# Patient Record
Sex: FEMALE | Employment: OTHER | URBAN - METROPOLITAN AREA
[De-identification: names, ages, dates, MRNs, and addresses within clinical notes are randomized per-mention and may not be internally consistent; named-entity substitution may affect disease eponyms.]

---

## 2022-01-01 ENCOUNTER — APPOINTMENT (OUTPATIENT)
Dept: CT IMAGING | Age: 82
DRG: 082 | End: 2022-01-01
Payer: MEDICARE

## 2022-01-01 ENCOUNTER — HOSPITAL ENCOUNTER (INPATIENT)
Age: 82
LOS: 6 days | DRG: 082 | End: 2022-12-05
Attending: EMERGENCY MEDICINE | Admitting: SURGERY
Payer: MEDICARE

## 2022-01-01 ENCOUNTER — APPOINTMENT (OUTPATIENT)
Dept: INTERVENTIONAL RADIOLOGY/VASCULAR | Age: 82
DRG: 082 | End: 2022-01-01
Payer: MEDICARE

## 2022-01-01 ENCOUNTER — APPOINTMENT (OUTPATIENT)
Dept: GENERAL RADIOLOGY | Age: 82
DRG: 082 | End: 2022-01-01
Payer: MEDICARE

## 2022-01-01 VITALS
BODY MASS INDEX: 31.28 KG/M2 | RESPIRATION RATE: 16 BRPM | SYSTOLIC BLOOD PRESSURE: 222 MMHG | WEIGHT: 145 LBS | TEMPERATURE: 97.9 F | OXYGEN SATURATION: 100 % | HEART RATE: 108 BPM | HEIGHT: 57 IN | DIASTOLIC BLOOD PRESSURE: 114 MMHG

## 2022-01-01 DIAGNOSIS — S06.5XAA SUBDURAL HEMATOMA: Primary | ICD-10-CM

## 2022-01-01 LAB
ABO/RH: NORMAL
ABSOLUTE EOS #: 0.14 K/UL (ref 0–0.44)
ABSOLUTE EOS #: 0.16 K/UL (ref 0–0.44)
ABSOLUTE EOS #: 0.18 K/UL (ref 0–0.44)
ABSOLUTE EOS #: 0.19 K/UL (ref 0–0.44)
ABSOLUTE IMMATURE GRANULOCYTE: 0.04 K/UL (ref 0–0.3)
ABSOLUTE IMMATURE GRANULOCYTE: 0.06 K/UL (ref 0–0.3)
ABSOLUTE IMMATURE GRANULOCYTE: 0.07 K/UL (ref 0–0.3)
ABSOLUTE IMMATURE GRANULOCYTE: 0.07 K/UL (ref 0–0.3)
ABSOLUTE LYMPH #: 0.96 K/UL (ref 1.1–3.7)
ABSOLUTE LYMPH #: 1.29 K/UL (ref 1.1–3.7)
ABSOLUTE LYMPH #: 1.41 K/UL (ref 1.1–3.7)
ABSOLUTE LYMPH #: 1.49 K/UL (ref 1.1–3.7)
ABSOLUTE MONO #: 0.74 K/UL (ref 0.1–1.2)
ABSOLUTE MONO #: 0.87 K/UL (ref 0.1–1.2)
ABSOLUTE MONO #: 0.92 K/UL (ref 0.1–1.2)
ABSOLUTE MONO #: 1.03 K/UL (ref 0.1–1.2)
ALBUMIN SERPL-MCNC: 4.3 G/DL (ref 3.5–5.2)
ANION GAP SERPL CALCULATED.3IONS-SCNC: 10 MMOL/L (ref 9–17)
ANION GAP SERPL CALCULATED.3IONS-SCNC: 11 MMOL/L (ref 9–17)
ANION GAP SERPL CALCULATED.3IONS-SCNC: 12 MMOL/L (ref 9–17)
ANION GAP SERPL CALCULATED.3IONS-SCNC: 14 MMOL/L (ref 9–17)
ANION GAP SERPL CALCULATED.3IONS-SCNC: 15 MMOL/L (ref 9–17)
ANTIBODY SCREEN: NEGATIVE
ARM BAND NUMBER: NORMAL
BASOPHILS # BLD: 0 % (ref 0–2)
BASOPHILS # BLD: 0 % (ref 0–2)
BASOPHILS # BLD: 1 % (ref 0–2)
BASOPHILS # BLD: 1 % (ref 0–2)
BASOPHILS ABSOLUTE: 0.03 K/UL (ref 0–0.2)
BASOPHILS ABSOLUTE: 0.04 K/UL (ref 0–0.2)
BASOPHILS ABSOLUTE: 0.06 K/UL (ref 0–0.2)
BASOPHILS ABSOLUTE: 0.08 K/UL (ref 0–0.2)
BILIRUBIN URINE: NEGATIVE
BLOOD BANK SPECIMEN: ABNORMAL
BUN BLDV-MCNC: 10 MG/DL (ref 8–23)
BUN BLDV-MCNC: 10 MG/DL (ref 8–23)
BUN BLDV-MCNC: 13 MG/DL (ref 8–23)
BUN BLDV-MCNC: 7 MG/DL (ref 8–23)
BUN BLDV-MCNC: 7 MG/DL (ref 8–23)
BUN BLDV-MCNC: 9 MG/DL (ref 8–23)
CALCIUM IONIZED: 1.11 MMOL/L (ref 1.13–1.33)
CALCIUM SERPL-MCNC: 7.8 MG/DL (ref 8.6–10.4)
CALCIUM SERPL-MCNC: 8.5 MG/DL (ref 8.6–10.4)
CALCIUM SERPL-MCNC: 8.8 MG/DL (ref 8.6–10.4)
CALCIUM SERPL-MCNC: 8.8 MG/DL (ref 8.6–10.4)
CALCIUM SERPL-MCNC: 9.1 MG/DL (ref 8.6–10.4)
CARBOXYHEMOGLOBIN: 1.2 % (ref 0–5)
CHLORIDE BLD-SCNC: 100 MMOL/L (ref 98–107)
CHLORIDE BLD-SCNC: 101 MMOL/L (ref 98–107)
CHLORIDE BLD-SCNC: 90 MMOL/L (ref 98–107)
CHLORIDE BLD-SCNC: 94 MMOL/L (ref 98–107)
CHLORIDE BLD-SCNC: 94 MMOL/L (ref 98–107)
CHLORIDE BLD-SCNC: 96 MMOL/L (ref 98–107)
CHLORIDE BLD-SCNC: 99 MMOL/L (ref 98–107)
CHLORIDE BLD-SCNC: 99 MMOL/L (ref 98–107)
CO2: 18 MMOL/L (ref 20–31)
CO2: 20 MMOL/L (ref 20–31)
CO2: 21 MMOL/L (ref 20–31)
CO2: 21 MMOL/L (ref 20–31)
CO2: 22 MMOL/L (ref 20–31)
CO2: 23 MMOL/L (ref 20–31)
COLLAGEN ADENOSINE-5'-DIPHOSPHATE (ADP) TIME: 82 SEC (ref 67–112)
COLLAGEN EPINEPHRINE TIME: 116 SEC (ref 85–172)
COLOR: YELLOW
COMMENT UA: ABNORMAL
CREAT SERPL-MCNC: 0.44 MG/DL (ref 0.5–0.9)
CREAT SERPL-MCNC: 0.45 MG/DL (ref 0.5–0.9)
CREAT SERPL-MCNC: 0.47 MG/DL (ref 0.5–0.9)
CREAT SERPL-MCNC: 0.49 MG/DL (ref 0.5–0.9)
CREAT SERPL-MCNC: 0.54 MG/DL (ref 0.5–0.9)
CREAT SERPL-MCNC: 0.66 MG/DL (ref 0.5–0.9)
CREAT SERPL-MCNC: 0.7 MG/DL (ref 0.5–0.9)
CREAT SERPL-MCNC: 0.74 MG/DL (ref 0.5–0.9)
EOSINOPHILS RELATIVE PERCENT: 1 % (ref 1–4)
EOSINOPHILS RELATIVE PERCENT: 1 % (ref 1–4)
EOSINOPHILS RELATIVE PERCENT: 2 % (ref 1–4)
EOSINOPHILS RELATIVE PERCENT: 2 % (ref 1–4)
ESTIMATED AVERAGE GLUCOSE: 103 MG/DL
ETHANOL PERCENT: <0.01 %
ETHANOL: <10 MG/DL
EXPIRATION DATE: NORMAL
FIO2: ABNORMAL
GFR SERPL CREATININE-BSD FRML MDRD: 56 ML/MIN/1.73M2
GFR SERPL CREATININE-BSD FRML MDRD: >60 ML/MIN/1.73M2
GLUCOSE BLD-MCNC: 100 MG/DL (ref 70–99)
GLUCOSE BLD-MCNC: 106 MG/DL (ref 70–99)
GLUCOSE BLD-MCNC: 107 MG/DL (ref 70–99)
GLUCOSE BLD-MCNC: 116 MG/DL (ref 70–99)
GLUCOSE BLD-MCNC: 126 MG/DL (ref 70–99)
GLUCOSE BLD-MCNC: 135 MG/DL (ref 70–99)
GLUCOSE BLD-MCNC: 136 MG/DL (ref 65–105)
GLUCOSE BLD-MCNC: 150 MG/DL (ref 70–99)
GLUCOSE BLD-MCNC: 92 MG/DL (ref 70–99)
GLUCOSE URINE: NEGATIVE
HBA1C MFR BLD: 5.2 % (ref 4–6)
HCG QUALITATIVE: NEGATIVE
HCO3 VENOUS: 25.8 MMOL/L (ref 24–30)
HCT VFR BLD CALC: 28.6 % (ref 36.3–47.1)
HCT VFR BLD CALC: 29.9 % (ref 36.3–47.1)
HCT VFR BLD CALC: 31.6 % (ref 36.3–47.1)
HCT VFR BLD CALC: 33.4 % (ref 36.3–47.1)
HCT VFR BLD CALC: 38.6 % (ref 36.3–47.1)
HEMOGLOBIN: 10.2 G/DL (ref 11.9–15.1)
HEMOGLOBIN: 10.4 G/DL (ref 11.9–15.1)
HEMOGLOBIN: 11.2 G/DL (ref 11.9–15.1)
HEMOGLOBIN: 13.2 G/DL (ref 11.9–15.1)
HEMOGLOBIN: 9.9 G/DL (ref 11.9–15.1)
IMMATURE GRANULOCYTES: 0 %
IMMATURE GRANULOCYTES: 1 %
INR BLD: 1
KETONES, URINE: NEGATIVE
LEUKOCYTE ESTERASE, URINE: NEGATIVE
LYMPHOCYTES # BLD: 11 % (ref 24–43)
LYMPHOCYTES # BLD: 13 % (ref 24–43)
LYMPHOCYTES # BLD: 16 % (ref 24–43)
LYMPHOCYTES # BLD: 9 % (ref 24–43)
MAGNESIUM: 1.5 MG/DL (ref 1.6–2.6)
MAGNESIUM: 1.7 MG/DL (ref 1.6–2.6)
MAGNESIUM: 2 MG/DL (ref 1.6–2.6)
MCH RBC QN AUTO: 31.3 PG (ref 25.2–33.5)
MCH RBC QN AUTO: 31.6 PG (ref 25.2–33.5)
MCH RBC QN AUTO: 31.7 PG (ref 25.2–33.5)
MCH RBC QN AUTO: 31.7 PG (ref 25.2–33.5)
MCH RBC QN AUTO: 32 PG (ref 25.2–33.5)
MCHC RBC AUTO-ENTMCNC: 32.9 G/DL (ref 28.4–34.8)
MCHC RBC AUTO-ENTMCNC: 33.5 G/DL (ref 28.4–34.8)
MCHC RBC AUTO-ENTMCNC: 34.1 G/DL (ref 28.4–34.8)
MCHC RBC AUTO-ENTMCNC: 34.2 G/DL (ref 28.4–34.8)
MCHC RBC AUTO-ENTMCNC: 34.6 G/DL (ref 28.4–34.8)
MCV RBC AUTO: 91.5 FL (ref 82.6–102.9)
MCV RBC AUTO: 91.7 FL (ref 82.6–102.9)
MCV RBC AUTO: 92.9 FL (ref 82.6–102.9)
MCV RBC AUTO: 94.4 FL (ref 82.6–102.9)
MCV RBC AUTO: 97.2 FL (ref 82.6–102.9)
MONOCYTES # BLD: 11 % (ref 3–12)
MONOCYTES # BLD: 7 % (ref 3–12)
MONOCYTES # BLD: 8 % (ref 3–12)
MONOCYTES # BLD: 8 % (ref 3–12)
MRSA, DNA, NASAL: NEGATIVE
MYOGLOBIN: 56 NG/ML (ref 25–58)
NEGATIVE BASE EXCESS, VEN: 0.2 MMOL/L (ref 0–2)
NITRITE, URINE: NEGATIVE
NRBC AUTOMATED: 0 PER 100 WBC
O2 SAT, VEN: 57.4 % (ref 60–85)
PARTIAL THROMBOPLASTIN TIME: 25 SEC (ref 20.5–30.5)
PATIENT TEMP: 37
PCO2, VEN: 49.9 MM HG (ref 39–55)
PDW BLD-RTO: 12.7 % (ref 11.8–14.4)
PDW BLD-RTO: 12.9 % (ref 11.8–14.4)
PDW BLD-RTO: 13 % (ref 11.8–14.4)
PH UA: 7.5 (ref 5–8)
PH VENOUS: 7.33 (ref 7.32–7.42)
PHOSPHORUS: 3.1 MG/DL (ref 2.6–4.5)
PLATELET # BLD: 188 K/UL (ref 138–453)
PLATELET # BLD: 191 K/UL (ref 138–453)
PLATELET # BLD: 213 K/UL (ref 138–453)
PLATELET # BLD: 240 K/UL (ref 138–453)
PLATELET # BLD: 247 K/UL (ref 138–453)
PLATELET FUNCTION INTERP: NORMAL
PMV BLD AUTO: 10.3 FL (ref 8.1–13.5)
PMV BLD AUTO: 10.4 FL (ref 8.1–13.5)
PMV BLD AUTO: 10.9 FL (ref 8.1–13.5)
PMV BLD AUTO: 11.4 FL (ref 8.1–13.5)
PMV BLD AUTO: 11.5 FL (ref 8.1–13.5)
PO2, VEN: 33 MM HG (ref 30–50)
POTASSIUM SERPL-SCNC: 3.1 MMOL/L (ref 3.7–5.3)
POTASSIUM SERPL-SCNC: 3.3 MMOL/L (ref 3.7–5.3)
POTASSIUM SERPL-SCNC: 3.4 MMOL/L (ref 3.7–5.3)
POTASSIUM SERPL-SCNC: 3.7 MMOL/L (ref 3.7–5.3)
POTASSIUM SERPL-SCNC: 3.9 MMOL/L (ref 3.7–5.3)
POTASSIUM SERPL-SCNC: 4 MMOL/L (ref 3.7–5.3)
PROTEIN UA: NEGATIVE
PROTHROMBIN TIME: 10.3 SEC (ref 9.1–12.3)
RBC # BLD: 3.12 M/UL (ref 3.95–5.11)
RBC # BLD: 3.22 M/UL (ref 3.95–5.11)
RBC # BLD: 3.25 M/UL (ref 3.95–5.11)
RBC # BLD: 3.54 M/UL (ref 3.95–5.11)
RBC # BLD: 4.22 M/UL (ref 3.95–5.11)
SEG NEUTROPHILS: 71 % (ref 36–65)
SEG NEUTROPHILS: 75 % (ref 36–65)
SEG NEUTROPHILS: 78 % (ref 36–65)
SEG NEUTROPHILS: 82 % (ref 36–65)
SEGMENTED NEUTROPHILS ABSOLUTE COUNT: 6.3 K/UL (ref 1.5–8.1)
SEGMENTED NEUTROPHILS ABSOLUTE COUNT: 8.31 K/UL (ref 1.5–8.1)
SEGMENTED NEUTROPHILS ABSOLUTE COUNT: 8.33 K/UL (ref 1.5–8.1)
SEGMENTED NEUTROPHILS ABSOLUTE COUNT: 8.89 K/UL (ref 1.5–8.1)
SODIUM BLD-SCNC: 125 MMOL/L (ref 135–144)
SODIUM BLD-SCNC: 129 MMOL/L (ref 135–144)
SODIUM BLD-SCNC: 129 MMOL/L (ref 135–144)
SODIUM BLD-SCNC: 130 MMOL/L (ref 135–144)
SODIUM BLD-SCNC: 131 MMOL/L (ref 135–144)
SODIUM BLD-SCNC: 132 MMOL/L (ref 135–144)
SODIUM BLD-SCNC: 133 MMOL/L (ref 135–144)
SODIUM BLD-SCNC: 133 MMOL/L (ref 135–144)
SPECIFIC GRAVITY UA: 1.05 (ref 1–1.03)
SPECIMEN DESCRIPTION: NORMAL
THYROXINE, FREE: 1.41 NG/DL (ref 0.93–1.7)
TROPONIN, HIGH SENSITIVITY: 9 NG/L (ref 0–14)
TSH SERPL DL<=0.05 MIU/L-ACNC: 1.19 UIU/ML (ref 0.3–5)
TURBIDITY: CLEAR
URINE HGB: NEGATIVE
UROBILINOGEN, URINE: NORMAL
VITAMIN B-12: 1202 PG/ML (ref 232–1245)
VITAMIN D 25-HYDROXY: 16.5 NG/ML
WBC # BLD: 10.3 K/UL (ref 3.5–11.3)
WBC # BLD: 10.9 K/UL (ref 3.5–11.3)
WBC # BLD: 11.4 K/UL (ref 3.5–11.3)
WBC # BLD: 16.4 K/UL (ref 3.5–11.3)
WBC # BLD: 9.1 K/UL (ref 3.5–11.3)

## 2022-01-01 PROCEDURE — 85025 COMPLETE CBC W/AUTO DIFF WBC: CPT

## 2022-01-01 PROCEDURE — 6370000000 HC RX 637 (ALT 250 FOR IP): Performed by: STUDENT IN AN ORGANIZED HEALTH CARE EDUCATION/TRAINING PROGRAM

## 2022-01-01 PROCEDURE — 97110 THERAPEUTIC EXERCISES: CPT

## 2022-01-01 PROCEDURE — 2580000003 HC RX 258

## 2022-01-01 PROCEDURE — 6370000000 HC RX 637 (ALT 250 FOR IP): Performed by: NURSE PRACTITIONER

## 2022-01-01 PROCEDURE — 2580000003 HC RX 258: Performed by: NURSE PRACTITIONER

## 2022-01-01 PROCEDURE — C1769 GUIDE WIRE: HCPCS

## 2022-01-01 PROCEDURE — 2500000003 HC RX 250 WO HCPCS

## 2022-01-01 PROCEDURE — 36415 COLL VENOUS BLD VENIPUNCTURE: CPT

## 2022-01-01 PROCEDURE — 6360000002 HC RX W HCPCS: Performed by: STUDENT IN AN ORGANIZED HEALTH CARE EDUCATION/TRAINING PROGRAM

## 2022-01-01 PROCEDURE — 84100 ASSAY OF PHOSPHORUS: CPT

## 2022-01-01 PROCEDURE — APPNB30 APP NON BILLABLE TIME 0-30 MINS: Performed by: REGISTERED NURSE

## 2022-01-01 PROCEDURE — 97530 THERAPEUTIC ACTIVITIES: CPT

## 2022-01-01 PROCEDURE — 71260 CT THORAX DX C+: CPT

## 2022-01-01 PROCEDURE — 80048 BASIC METABOLIC PNL TOTAL CA: CPT

## 2022-01-01 PROCEDURE — 85730 THROMBOPLASTIN TIME PARTIAL: CPT

## 2022-01-01 PROCEDURE — 99232 SBSQ HOSP IP/OBS MODERATE 35: CPT | Performed by: NEUROLOGICAL SURGERY

## 2022-01-01 PROCEDURE — 83735 ASSAY OF MAGNESIUM: CPT

## 2022-01-01 PROCEDURE — 99222 1ST HOSP IP/OBS MODERATE 55: CPT | Performed by: FAMILY MEDICINE

## 2022-01-01 PROCEDURE — 86901 BLOOD TYPING SEROLOGIC RH(D): CPT

## 2022-01-01 PROCEDURE — 6360000002 HC RX W HCPCS

## 2022-01-01 PROCEDURE — APPSS15 APP SPLIT SHARED TIME 0-15 MINUTES: Performed by: NURSE PRACTITIONER

## 2022-01-01 PROCEDURE — 6360000004 HC RX CONTRAST MEDICATION

## 2022-01-01 PROCEDURE — 99231 SBSQ HOSP IP/OBS SF/LOW 25: CPT | Performed by: NEUROLOGICAL SURGERY

## 2022-01-01 PROCEDURE — 82306 VITAMIN D 25 HYDROXY: CPT

## 2022-01-01 PROCEDURE — 2580000003 HC RX 258: Performed by: STUDENT IN AN ORGANIZED HEALTH CARE EDUCATION/TRAINING PROGRAM

## 2022-01-01 PROCEDURE — B0281ZZ COMPUTERIZED TOMOGRAPHY (CT SCAN) OF CEREBRAL VENTRICLE(S) USING LOW OSMOLAR CONTRAST: ICD-10-PCS | Performed by: PSYCHIATRY & NEUROLOGY

## 2022-01-01 PROCEDURE — 99152 MOD SED SAME PHYS/QHP 5/>YRS: CPT

## 2022-01-01 PROCEDURE — 2580000003 HC RX 258: Performed by: PSYCHIATRY & NEUROLOGY

## 2022-01-01 PROCEDURE — 85610 PROTHROMBIN TIME: CPT

## 2022-01-01 PROCEDURE — 99233 SBSQ HOSP IP/OBS HIGH 50: CPT | Performed by: PSYCHIATRY & NEUROLOGY

## 2022-01-01 PROCEDURE — 70450 CT HEAD/BRAIN W/O DYE: CPT

## 2022-01-01 PROCEDURE — 2000000000 HC ICU R&B

## 2022-01-01 PROCEDURE — 36226 PLACE CATH VERTEBRAL ART: CPT

## 2022-01-01 PROCEDURE — 36224 PLACE CATH CAROTD ART: CPT

## 2022-01-01 PROCEDURE — 72125 CT NECK SPINE W/O DYE: CPT

## 2022-01-01 PROCEDURE — 81003 URINALYSIS AUTO W/O SCOPE: CPT

## 2022-01-01 PROCEDURE — 82947 ASSAY GLUCOSE BLOOD QUANT: CPT

## 2022-01-01 PROCEDURE — 2060000000 HC ICU INTERMEDIATE R&B

## 2022-01-01 PROCEDURE — 82607 VITAMIN B-12: CPT

## 2022-01-01 PROCEDURE — 73552 X-RAY EXAM OF FEMUR 2/>: CPT

## 2022-01-01 PROCEDURE — 92523 SPEECH SOUND LANG COMPREHEN: CPT

## 2022-01-01 PROCEDURE — 97535 SELF CARE MNGMENT TRAINING: CPT

## 2022-01-01 PROCEDURE — C1887 CATHETER, GUIDING: HCPCS

## 2022-01-01 PROCEDURE — 3209999900 CT LUMBAR SPINE TRAUMA RECONSTRUCTION

## 2022-01-01 PROCEDURE — 36227 PLACE CATH XTRNL CAROTID: CPT

## 2022-01-01 PROCEDURE — 99153 MOD SED SAME PHYS/QHP EA: CPT

## 2022-01-01 PROCEDURE — 99285 EMERGENCY DEPT VISIT HI MDM: CPT

## 2022-01-01 PROCEDURE — 6370000000 HC RX 637 (ALT 250 FOR IP)

## 2022-01-01 PROCEDURE — 80051 ELECTROLYTE PANEL: CPT

## 2022-01-01 PROCEDURE — 84443 ASSAY THYROID STIM HORMONE: CPT

## 2022-01-01 PROCEDURE — 84439 ASSAY OF FREE THYROXINE: CPT

## 2022-01-01 PROCEDURE — 4A03X5D MEASUREMENT OF ARTERIAL FLOW, INTRACRANIAL, EXTERNAL APPROACH: ICD-10-PCS

## 2022-01-01 PROCEDURE — C1760 CLOSURE DEV, VASC: HCPCS

## 2022-01-01 PROCEDURE — 97166 OT EVAL MOD COMPLEX 45 MIN: CPT

## 2022-01-01 PROCEDURE — 2700000000 HC OXYGEN THERAPY PER DAY

## 2022-01-01 PROCEDURE — 82565 ASSAY OF CREATININE: CPT

## 2022-01-01 PROCEDURE — 70498 CT ANGIOGRAPHY NECK: CPT

## 2022-01-01 PROCEDURE — 6360000004 HC RX CONTRAST MEDICATION: Performed by: NURSE PRACTITIONER

## 2022-01-01 PROCEDURE — 94761 N-INVAS EAR/PLS OXIMETRY MLT: CPT

## 2022-01-01 PROCEDURE — 87641 MR-STAPH DNA AMP PROBE: CPT

## 2022-01-01 PROCEDURE — 70496 CT ANGIOGRAPHY HEAD: CPT

## 2022-01-01 PROCEDURE — 3209999900 CT THORACIC SPINE TRAUMA RECONSTRUCTION

## 2022-01-01 PROCEDURE — 82330 ASSAY OF CALCIUM: CPT

## 2022-01-01 PROCEDURE — 86900 BLOOD TYPING SEROLOGIC ABO: CPT

## 2022-01-01 PROCEDURE — 83036 HEMOGLOBIN GLYCOSYLATED A1C: CPT

## 2022-01-01 PROCEDURE — 92507 TX SP LANG VOICE COMM INDIV: CPT

## 2022-01-01 PROCEDURE — 6360000004 HC RX CONTRAST MEDICATION: Performed by: SURGERY

## 2022-01-01 PROCEDURE — 6360000002 HC RX W HCPCS: Performed by: PSYCHIATRY & NEUROLOGY

## 2022-01-01 PROCEDURE — 84703 CHORIONIC GONADOTROPIN ASSAY: CPT

## 2022-01-01 PROCEDURE — 97162 PT EVAL MOD COMPLEX 30 MIN: CPT

## 2022-01-01 PROCEDURE — 82040 ASSAY OF SERUM ALBUMIN: CPT

## 2022-01-01 PROCEDURE — 2709999900 HC NON-CHARGEABLE SUPPLY

## 2022-01-01 PROCEDURE — 86850 RBC ANTIBODY SCREEN: CPT

## 2022-01-01 PROCEDURE — 99223 1ST HOSP IP/OBS HIGH 75: CPT | Performed by: PSYCHIATRY & NEUROLOGY

## 2022-01-01 PROCEDURE — 82805 BLOOD GASES W/O2 SATURATION: CPT

## 2022-01-01 PROCEDURE — 85027 COMPLETE CBC AUTOMATED: CPT

## 2022-01-01 PROCEDURE — 85576 BLOOD PLATELET AGGREGATION: CPT

## 2022-01-01 PROCEDURE — 84520 ASSAY OF UREA NITROGEN: CPT

## 2022-01-01 PROCEDURE — C1894 INTRO/SHEATH, NON-LASER: HCPCS

## 2022-01-01 PROCEDURE — 83874 ASSAY OF MYOGLOBIN: CPT

## 2022-01-01 PROCEDURE — 84484 ASSAY OF TROPONIN QUANT: CPT

## 2022-01-01 PROCEDURE — G0480 DRUG TEST DEF 1-7 CLASSES: HCPCS

## 2022-01-01 PROCEDURE — 51702 INSERT TEMP BLADDER CATH: CPT

## 2022-01-01 RX ORDER — ONDANSETRON 4 MG/1
4 TABLET, ORALLY DISINTEGRATING ORAL EVERY 8 HOURS PRN
Status: DISCONTINUED | OUTPATIENT
Start: 2022-01-01 | End: 2022-01-01 | Stop reason: HOSPADM

## 2022-01-01 RX ORDER — POTASSIUM CHLORIDE 7.45 MG/ML
10 INJECTION INTRAVENOUS
Status: DISCONTINUED | OUTPATIENT
Start: 2022-01-01 | End: 2022-01-01

## 2022-01-01 RX ORDER — SODIUM CHLORIDE 9 MG/ML
INJECTION, SOLUTION INTRAVENOUS PRN
Status: DISCONTINUED | OUTPATIENT
Start: 2022-01-01 | End: 2022-01-01

## 2022-01-01 RX ORDER — HYDROCHLOROTHIAZIDE 25 MG/1
25 TABLET ORAL DAILY
COMMUNITY

## 2022-01-01 RX ORDER — LEVOFLOXACIN 5 MG/ML
INJECTION, SOLUTION INTRAVENOUS CONTINUOUS PRN
Status: COMPLETED | OUTPATIENT
Start: 2022-01-01 | End: 2022-01-01

## 2022-01-01 RX ORDER — POLYETHYLENE GLYCOL 3350 17 G/17G
17 POWDER, FOR SOLUTION ORAL DAILY
Status: DISCONTINUED | OUTPATIENT
Start: 2022-01-01 | End: 2022-01-01

## 2022-01-01 RX ORDER — GLIMEPIRIDE 2 MG/1
1 TABLET ORAL 2 TIMES DAILY
Status: DISCONTINUED | OUTPATIENT
Start: 2022-01-01 | End: 2022-01-01

## 2022-01-01 RX ORDER — LEVETIRACETAM 10 MG/ML
1000 INJECTION INTRAVASCULAR ONCE
Status: COMPLETED | OUTPATIENT
Start: 2022-01-01 | End: 2022-01-01

## 2022-01-01 RX ORDER — ONDANSETRON 2 MG/ML
4 INJECTION INTRAMUSCULAR; INTRAVENOUS ONCE
Status: COMPLETED | OUTPATIENT
Start: 2022-01-01 | End: 2022-01-01

## 2022-01-01 RX ORDER — SODIUM CHLORIDE 0.9 % (FLUSH) 0.9 %
5-40 SYRINGE (ML) INJECTION EVERY 12 HOURS SCHEDULED
Status: DISCONTINUED | OUTPATIENT
Start: 2022-01-01 | End: 2022-01-01

## 2022-01-01 RX ORDER — LATANOPROST 50 UG/ML
1 SOLUTION/ DROPS OPHTHALMIC NIGHTLY
Status: DISCONTINUED | OUTPATIENT
Start: 2022-01-01 | End: 2022-01-01

## 2022-01-01 RX ORDER — LISINOPRIL 10 MG/1
10 TABLET ORAL DAILY
COMMUNITY

## 2022-01-01 RX ORDER — MORPHINE SULFATE 2 MG/ML
2 INJECTION, SOLUTION INTRAMUSCULAR; INTRAVENOUS
Status: DISCONTINUED | OUTPATIENT
Start: 2022-01-01 | End: 2022-01-01 | Stop reason: HOSPADM

## 2022-01-01 RX ORDER — LEVETIRACETAM 5 MG/ML
500 INJECTION INTRAVASCULAR EVERY 12 HOURS
Status: DISCONTINUED | OUTPATIENT
Start: 2022-01-01 | End: 2022-01-01

## 2022-01-01 RX ORDER — ONDANSETRON 2 MG/ML
4 INJECTION INTRAMUSCULAR; INTRAVENOUS EVERY 6 HOURS PRN
Status: DISCONTINUED | OUTPATIENT
Start: 2022-01-01 | End: 2022-01-01 | Stop reason: HOSPADM

## 2022-01-01 RX ORDER — LEVETIRACETAM 500 MG/1
500 TABLET ORAL 2 TIMES DAILY
Status: DISCONTINUED | OUTPATIENT
Start: 2022-01-01 | End: 2022-01-01

## 2022-01-01 RX ORDER — LEVOFLOXACIN 5 MG/ML
500 INJECTION, SOLUTION INTRAVENOUS ONCE
Status: DISCONTINUED | OUTPATIENT
Start: 2022-01-01 | End: 2022-01-01

## 2022-01-01 RX ORDER — ACETAMINOPHEN 500 MG
500 TABLET ORAL EVERY 6 HOURS PRN
COMMUNITY

## 2022-01-01 RX ORDER — ONDANSETRON 2 MG/ML
INJECTION INTRAMUSCULAR; INTRAVENOUS
Status: COMPLETED
Start: 2022-01-01 | End: 2022-01-01

## 2022-01-01 RX ORDER — SODIUM CHLORIDE 9 MG/ML
INJECTION, SOLUTION INTRAVENOUS CONTINUOUS
Status: DISCONTINUED | OUTPATIENT
Start: 2022-01-01 | End: 2022-01-01

## 2022-01-01 RX ORDER — ACETAMINOPHEN 500 MG
1000 TABLET ORAL EVERY 8 HOURS SCHEDULED
Status: DISCONTINUED | OUTPATIENT
Start: 2022-01-01 | End: 2022-01-01

## 2022-01-01 RX ORDER — ACETAMINOPHEN 325 MG/1
650 TABLET ORAL EVERY 4 HOURS PRN
Status: DISCONTINUED | OUTPATIENT
Start: 2022-01-01 | End: 2022-01-01

## 2022-01-01 RX ORDER — LISINOPRIL 10 MG/1
10 TABLET ORAL DAILY
Status: DISCONTINUED | OUTPATIENT
Start: 2022-01-01 | End: 2022-01-01

## 2022-01-01 RX ORDER — LEVETIRACETAM 10 MG/ML
INJECTION INTRAVASCULAR
Status: COMPLETED
Start: 2022-01-01 | End: 2022-01-01

## 2022-01-01 RX ORDER — ASPIRIN 81 MG/1
81 TABLET, CHEWABLE ORAL DAILY
COMMUNITY

## 2022-01-01 RX ORDER — SENNA PLUS 8.6 MG/1
1 TABLET ORAL NIGHTLY
Status: DISCONTINUED | OUTPATIENT
Start: 2022-01-01 | End: 2022-01-01

## 2022-01-01 RX ORDER — ENOXAPARIN SODIUM 100 MG/ML
30 INJECTION SUBCUTANEOUS 2 TIMES DAILY
Status: DISCONTINUED | OUTPATIENT
Start: 2022-01-01 | End: 2022-01-01

## 2022-01-01 RX ORDER — DIPHENHYDRAMINE HYDROCHLORIDE, ZINC ACETATE 2; .1 G/100G; G/100G
CREAM TOPICAL 3 TIMES DAILY PRN
Status: DISCONTINUED | OUTPATIENT
Start: 2022-01-01 | End: 2022-01-01

## 2022-01-01 RX ORDER — ALPRAZOLAM 0.25 MG/1
0.25 TABLET ORAL NIGHTLY PRN
COMMUNITY

## 2022-01-01 RX ORDER — HYDROCHLOROTHIAZIDE 25 MG/1
25 TABLET ORAL DAILY
Status: DISCONTINUED | OUTPATIENT
Start: 2022-01-01 | End: 2022-01-01

## 2022-01-01 RX ORDER — FENTANYL CITRATE 50 UG/ML
INJECTION, SOLUTION INTRAMUSCULAR; INTRAVENOUS
Status: COMPLETED | OUTPATIENT
Start: 2022-01-01 | End: 2022-01-01

## 2022-01-01 RX ORDER — SODIUM CHLORIDE 0.9 % (FLUSH) 0.9 %
5-40 SYRINGE (ML) INJECTION PRN
Status: DISCONTINUED | OUTPATIENT
Start: 2022-01-01 | End: 2022-01-01 | Stop reason: HOSPADM

## 2022-01-01 RX ORDER — LORAZEPAM 2 MG/ML
1 INJECTION INTRAMUSCULAR EVERY 6 HOURS PRN
Status: DISCONTINUED | OUTPATIENT
Start: 2022-01-01 | End: 2022-01-01 | Stop reason: HOSPADM

## 2022-01-01 RX ORDER — PROPRANOLOL HYDROCHLORIDE 10 MG/1
10 TABLET ORAL 3 TIMES DAILY
Status: DISCONTINUED | OUTPATIENT
Start: 2022-01-01 | End: 2022-01-01

## 2022-01-01 RX ORDER — MIDAZOLAM HYDROCHLORIDE 2 MG/2ML
INJECTION, SOLUTION INTRAMUSCULAR; INTRAVENOUS
Status: COMPLETED | OUTPATIENT
Start: 2022-01-01 | End: 2022-01-01

## 2022-01-01 RX ORDER — GLYCOPYRROLATE 0.2 MG/ML
0.2 INJECTION INTRAMUSCULAR; INTRAVENOUS EVERY 4 HOURS PRN
Status: DISCONTINUED | OUTPATIENT
Start: 2022-01-01 | End: 2022-01-01 | Stop reason: HOSPADM

## 2022-01-01 RX ORDER — SODIUM CHLORIDE 0.9 % (FLUSH) 0.9 %
5-40 SYRINGE (ML) INJECTION PRN
Status: DISCONTINUED | OUTPATIENT
Start: 2022-01-01 | End: 2022-01-01

## 2022-01-01 RX ADMIN — LATANOPROST 1 DROP: 50 SOLUTION OPHTHALMIC at 20:25

## 2022-01-01 RX ADMIN — SENNOSIDES 8.6 MG: 8.6 TABLET, COATED ORAL at 21:50

## 2022-01-01 RX ADMIN — ONDANSETRON 4 MG: 2 INJECTION INTRAMUSCULAR; INTRAVENOUS at 21:55

## 2022-01-01 RX ADMIN — TIMOLOL MALEATE 1 DROP: 2.5 SOLUTION OPHTHALMIC at 20:25

## 2022-01-01 RX ADMIN — SODIUM CHLORIDE, PRESERVATIVE FREE 20 MG: 5 INJECTION INTRAVENOUS at 01:21

## 2022-01-01 RX ADMIN — PROPRANOLOL HYDROCHLORIDE 10 MG: 10 TABLET ORAL at 21:50

## 2022-01-01 RX ADMIN — MIDAZOLAM HYDROCHLORIDE 0.5 MG: 1 INJECTION, SOLUTION INTRAMUSCULAR; INTRAVENOUS at 13:59

## 2022-01-01 RX ADMIN — SENNOSIDES 8.6 MG: 8.6 TABLET, COATED ORAL at 20:23

## 2022-01-01 RX ADMIN — ACETAMINOPHEN 1000 MG: 500 TABLET ORAL at 21:26

## 2022-01-01 RX ADMIN — ACETAMINOPHEN 1000 MG: 500 TABLET ORAL at 14:30

## 2022-01-01 RX ADMIN — ACETAMINOPHEN 1000 MG: 500 TABLET ORAL at 21:50

## 2022-01-01 RX ADMIN — PROPRANOLOL HYDROCHLORIDE 10 MG: 10 TABLET ORAL at 09:11

## 2022-01-01 RX ADMIN — PROPRANOLOL HYDROCHLORIDE 10 MG: 10 TABLET ORAL at 09:16

## 2022-01-01 RX ADMIN — FENTANYL CITRATE 50 MCG: 50 INJECTION, SOLUTION INTRAMUSCULAR; INTRAVENOUS at 13:59

## 2022-01-01 RX ADMIN — ACETAMINOPHEN 1000 MG: 500 TABLET ORAL at 10:51

## 2022-01-01 RX ADMIN — TIMOLOL MALEATE 1 DROP: 2.5 SOLUTION OPHTHALMIC at 21:57

## 2022-01-01 RX ADMIN — POTASSIUM BICARBONATE 40 MEQ: 782 TABLET, EFFERVESCENT ORAL at 09:09

## 2022-01-01 RX ADMIN — POTASSIUM BICARBONATE 20 MEQ: 782 TABLET, EFFERVESCENT ORAL at 11:31

## 2022-01-01 RX ADMIN — LEVETIRACETAM 500 MG: 500 TABLET, FILM COATED ORAL at 11:30

## 2022-01-01 RX ADMIN — POLYETHYLENE GLYCOL 3350 17 G: 17 POWDER, FOR SOLUTION ORAL at 09:10

## 2022-01-01 RX ADMIN — TIMOLOL MALEATE 1 DROP: 2.5 SOLUTION OPHTHALMIC at 11:51

## 2022-01-01 RX ADMIN — ACETAMINOPHEN 1000 MG: 500 TABLET ORAL at 16:27

## 2022-01-01 RX ADMIN — TIMOLOL MALEATE 1 DROP: 2.5 SOLUTION OPHTHALMIC at 08:44

## 2022-01-01 RX ADMIN — ACETAMINOPHEN 1000 MG: 500 TABLET ORAL at 14:52

## 2022-01-01 RX ADMIN — MORPHINE SULFATE 2 MG: 2 INJECTION, SOLUTION INTRAMUSCULAR; INTRAVENOUS at 13:39

## 2022-01-01 RX ADMIN — LATANOPROST 1 DROP: 50 SOLUTION OPHTHALMIC at 21:50

## 2022-01-01 RX ADMIN — TIMOLOL MALEATE 1 DROP: 2.5 SOLUTION OPHTHALMIC at 21:50

## 2022-01-01 RX ADMIN — ACETAMINOPHEN 1000 MG: 500 TABLET ORAL at 20:24

## 2022-01-01 RX ADMIN — SODIUM CHLORIDE, PRESERVATIVE FREE 10 ML: 5 INJECTION INTRAVENOUS at 20:25

## 2022-01-01 RX ADMIN — TIMOLOL MALEATE 1 DROP: 2.5 SOLUTION OPHTHALMIC at 21:26

## 2022-01-01 RX ADMIN — LEVETIRACETAM 500 MG: 500 TABLET, FILM COATED ORAL at 08:43

## 2022-01-01 RX ADMIN — PROPRANOLOL HYDROCHLORIDE 10 MG: 10 TABLET ORAL at 21:57

## 2022-01-01 RX ADMIN — GLYCOPYRROLATE 0.2 MG: 0.2 INJECTION INTRAMUSCULAR; INTRAVENOUS at 13:46

## 2022-01-01 RX ADMIN — ENOXAPARIN SODIUM 30 MG: 100 INJECTION SUBCUTANEOUS at 21:27

## 2022-01-01 RX ADMIN — LEVETIRACETAM 500 MG: 5 INJECTION INTRAVENOUS at 21:48

## 2022-01-01 RX ADMIN — PROPRANOLOL HYDROCHLORIDE 10 MG: 10 TABLET ORAL at 14:52

## 2022-01-01 RX ADMIN — TIMOLOL MALEATE 1 DROP: 2.5 SOLUTION OPHTHALMIC at 10:40

## 2022-01-01 RX ADMIN — LEVETIRACETAM 500 MG: 500 TABLET, FILM COATED ORAL at 20:23

## 2022-01-01 RX ADMIN — LEVETIRACETAM 500 MG: 500 TABLET, FILM COATED ORAL at 09:10

## 2022-01-01 RX ADMIN — POTASSIUM CHLORIDE 10 MEQ: 7.46 INJECTION, SOLUTION INTRAVENOUS at 10:09

## 2022-01-01 RX ADMIN — ACETAMINOPHEN 1000 MG: 500 TABLET ORAL at 16:16

## 2022-01-01 RX ADMIN — TIMOLOL MALEATE 1 DROP: 2.5 SOLUTION OPHTHALMIC at 21:00

## 2022-01-01 RX ADMIN — LEVETIRACETAM 500 MG: 5 INJECTION INTRAVENOUS at 10:43

## 2022-01-01 RX ADMIN — ACETAMINOPHEN 1000 MG: 500 TABLET ORAL at 21:57

## 2022-01-01 RX ADMIN — TIMOLOL MALEATE 1 DROP: 2.5 SOLUTION OPHTHALMIC at 09:11

## 2022-01-01 RX ADMIN — LEVETIRACETAM 1000 MG: 10 INJECTION INTRAVASCULAR at 21:56

## 2022-01-01 RX ADMIN — PROPRANOLOL HYDROCHLORIDE 10 MG: 10 TABLET ORAL at 20:08

## 2022-01-01 RX ADMIN — LEVETIRACETAM 500 MG: 500 TABLET, FILM COATED ORAL at 20:08

## 2022-01-01 RX ADMIN — PROPRANOLOL HYDROCHLORIDE 10 MG: 10 TABLET ORAL at 16:27

## 2022-01-01 RX ADMIN — LEVETIRACETAM 500 MG: 500 TABLET, FILM COATED ORAL at 21:26

## 2022-01-01 RX ADMIN — ACETAMINOPHEN 1000 MG: 500 TABLET ORAL at 05:53

## 2022-01-01 RX ADMIN — DIPHENHYDRAMINE HYDROCHLORIDE, ZINC ACETATE: 2; .1 CREAM TOPICAL at 09:18

## 2022-01-01 RX ADMIN — DIPHENHYDRAMINE HYDROCHLORIDE, ZINC ACETATE: 2; .1 CREAM TOPICAL at 03:33

## 2022-01-01 RX ADMIN — IOPAMIDOL 90 ML: 755 INJECTION, SOLUTION INTRAVENOUS at 04:41

## 2022-01-01 RX ADMIN — MIDAZOLAM HYDROCHLORIDE 0.5 MG: 1 INJECTION, SOLUTION INTRAMUSCULAR; INTRAVENOUS at 14:45

## 2022-01-01 RX ADMIN — ENOXAPARIN SODIUM 30 MG: 100 INJECTION SUBCUTANEOUS at 09:17

## 2022-01-01 RX ADMIN — PROPRANOLOL HYDROCHLORIDE 10 MG: 10 TABLET ORAL at 16:16

## 2022-01-01 RX ADMIN — ACETAMINOPHEN 1000 MG: 500 TABLET ORAL at 05:00

## 2022-01-01 RX ADMIN — SODIUM CHLORIDE, PRESERVATIVE FREE 10 ML: 5 INJECTION INTRAVENOUS at 09:14

## 2022-01-01 RX ADMIN — ENOXAPARIN SODIUM 30 MG: 100 INJECTION SUBCUTANEOUS at 20:24

## 2022-01-01 RX ADMIN — SENNOSIDES 8.6 MG: 8.6 TABLET, COATED ORAL at 21:26

## 2022-01-01 RX ADMIN — PROPRANOLOL HYDROCHLORIDE 10 MG: 10 TABLET ORAL at 08:31

## 2022-01-01 RX ADMIN — TIMOLOL MALEATE 1 DROP: 2.5 SOLUTION OPHTHALMIC at 08:31

## 2022-01-01 RX ADMIN — ENOXAPARIN SODIUM 30 MG: 100 INJECTION SUBCUTANEOUS at 08:43

## 2022-01-01 RX ADMIN — IOPAMIDOL 130 ML: 755 INJECTION, SOLUTION INTRAVENOUS at 21:31

## 2022-01-01 RX ADMIN — ONDANSETRON 4 MG: 4 TABLET, ORALLY DISINTEGRATING ORAL at 08:31

## 2022-01-01 RX ADMIN — SODIUM CHLORIDE: 9 INJECTION, SOLUTION INTRAVENOUS at 17:12

## 2022-01-01 RX ADMIN — ONDANSETRON 4 MG: 2 INJECTION INTRAMUSCULAR; INTRAVENOUS at 09:41

## 2022-01-01 RX ADMIN — POLYETHYLENE GLYCOL 3350 17 G: 17 POWDER, FOR SOLUTION ORAL at 08:31

## 2022-01-01 RX ADMIN — LEVETIRACETAM 500 MG: 5 INJECTION INTRAVENOUS at 10:16

## 2022-01-01 RX ADMIN — PROPRANOLOL HYDROCHLORIDE 10 MG: 10 TABLET ORAL at 10:54

## 2022-01-01 RX ADMIN — SENNOSIDES 8.6 MG: 8.6 TABLET, COATED ORAL at 21:57

## 2022-01-01 RX ADMIN — IOPAMIDOL 78 ML: 755 INJECTION, SOLUTION INTRAVENOUS at 15:15

## 2022-01-01 RX ADMIN — SODIUM CHLORIDE: 9 INJECTION, SOLUTION INTRAVENOUS at 00:53

## 2022-01-01 RX ADMIN — DIPHENHYDRAMINE HYDROCHLORIDE, ZINC ACETATE: 2; .1 CREAM TOPICAL at 21:27

## 2022-01-01 RX ADMIN — DESMOPRESSIN ACETATE 25.4 MCG: 4 SOLUTION INTRAVENOUS at 02:05

## 2022-01-01 RX ADMIN — LEVETIRACETAM 500 MG: 5 INJECTION INTRAVENOUS at 22:14

## 2022-01-01 RX ADMIN — LATANOPROST 1 DROP: 50 SOLUTION OPHTHALMIC at 21:26

## 2022-01-01 RX ADMIN — ACETAMINOPHEN 1000 MG: 500 TABLET ORAL at 01:25

## 2022-01-01 RX ADMIN — IOPAMIDOL 90 ML: 755 INJECTION, SOLUTION INTRAVENOUS at 08:34

## 2022-01-01 RX ADMIN — SODIUM CHLORIDE: 9 INJECTION, SOLUTION INTRAVENOUS at 05:23

## 2022-01-01 RX ADMIN — LATANOPROST 1 DROP: 50 SOLUTION OPHTHALMIC at 21:00

## 2022-01-01 RX ADMIN — LEVETIRACETAM 1000 MG: 10 INJECTION INTRAVENOUS at 21:56

## 2022-01-01 RX ADMIN — ACETAMINOPHEN 1000 MG: 500 TABLET ORAL at 20:03

## 2022-01-01 RX ADMIN — POLYETHYLENE GLYCOL 3350 17 G: 17 POWDER, FOR SOLUTION ORAL at 08:43

## 2022-01-01 RX ADMIN — LEVOFLOXACIN 500 MG: 5 INJECTION, SOLUTION INTRAVENOUS at 13:11

## 2022-01-01 RX ADMIN — PROPRANOLOL HYDROCHLORIDE 10 MG: 10 TABLET ORAL at 20:24

## 2022-01-01 RX ADMIN — LATANOPROST 1 DROP: 50 SOLUTION OPHTHALMIC at 21:57

## 2022-01-01 RX ADMIN — POLYETHYLENE GLYCOL 3350 17 G: 17 POWDER, FOR SOLUTION ORAL at 09:12

## 2022-01-01 ASSESSMENT — PAIN DESCRIPTION - LOCATION: LOCATION: HEAD

## 2022-01-01 ASSESSMENT — PAIN SCALES - GENERAL
PAINLEVEL_OUTOF10: 5
PAINLEVEL_OUTOF10: 7
PAINLEVEL_OUTOF10: 0
PAINLEVEL_OUTOF10: 2
PAINLEVEL_OUTOF10: 0
PAINLEVEL_OUTOF10: 0
PAINLEVEL_OUTOF10: 3
PAINLEVEL_OUTOF10: 0

## 2022-01-01 ASSESSMENT — PAIN DESCRIPTION - FREQUENCY: FREQUENCY: INTERMITTENT

## 2022-01-01 ASSESSMENT — PATIENT HEALTH QUESTIONNAIRE - PHQ9: SUM OF ALL RESPONSES TO PHQ QUESTIONS 1-9: 3

## 2022-01-01 ASSESSMENT — PAIN DESCRIPTION - DESCRIPTORS: DESCRIPTORS: ACHING

## 2022-01-01 ASSESSMENT — PAIN - FUNCTIONAL ASSESSMENT: PAIN_FUNCTIONAL_ASSESSMENT: 0-10

## 2022-01-01 ASSESSMENT — LIFESTYLE VARIABLES: HOW OFTEN DO YOU HAVE A DRINK CONTAINING ALCOHOL: MONTHLY OR LESS

## 2022-01-01 ASSESSMENT — ENCOUNTER SYMPTOMS: ABDOMINAL PAIN: 0
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
## 2022-11-30 PROBLEM — Z51.5 ENCOUNTER FOR PALLIATIVE CARE: Status: ACTIVE | Noted: 2022-01-01

## 2022-11-30 PROBLEM — I60.9 SAH (SUBARACHNOID HEMORRHAGE) (HCC): Status: ACTIVE | Noted: 2022-01-01

## 2022-11-30 PROBLEM — S06.5XAA SUBDURAL HEMATOMA: Status: ACTIVE | Noted: 2022-01-01

## 2022-11-30 PROBLEM — S06.6X1A TRAUMATIC SUBARACHNOID HEMORRHAGE WITH LOSS OF CONSCIOUSNESS OF 30 MINUTES OR LESS (HCC): Status: ACTIVE | Noted: 2022-01-01

## 2022-11-30 NOTE — PROGRESS NOTES
10:20 AM: Therapist attempted to meet with pt for received TRC Consult. Pt was unavailable at this time. Therapist will attempt to see pt in the PM or on 12/1/22.

## 2022-11-30 NOTE — PROGRESS NOTES
Physical Therapy        Physical Therapy Cancel Note      DATE: 2022    NAME: Samuel Bermudez  MRN: [id number]   : 1940      Patient not seen this date for Physical Therapy due to:    Strict Bedrest:  CTLS prec. In place currently. PT will continue to follow and address as indicated.       Electronically signed by Antione Reddy PT on 2022 at 8:28 AM

## 2022-11-30 NOTE — PLAN OF CARE
Images reviewed and patient examined with Dr PORTILLO [facility]  GCS 14  F/u repeat CT head today  Cervical spine clear  Activity as tolerated, PT and OT  F/u PLT function test

## 2022-11-30 NOTE — PLAN OF CARE
Problem: Discharge Planning  Goal: Discharge to home or other facility with appropriate resources  11/30/2022 1420 by Emely Plascencia RN  Outcome: Progressing     Problem: Pain  Goal: Verbalizes/displays adequate comfort level or baseline comfort level  11/30/2022 1420 by Emely Plascencia RN  Outcome: Progressing     Problem: Safety - Adult  Goal: Free from fall injury  11/30/2022 1420 by Emely Plascencia RN  Outcome: Progressing     Problem: ABCDS Injury Assessment  Goal: Absence of physical injury  11/30/2022 1420 by Emely Plascencia RN  Outcome: Progressing     Problem: Skin/Tissue Integrity  Goal: Absence of new skin breakdown  Description: 1. Monitor for areas of redness and/or skin breakdown  2. Assess vascular access sites hourly  3. Every 4-6 hours minimum:  Change oxygen saturation probe site  4. Every 4-6 hours:  If on nasal continuous positive airway pressure, respiratory therapy assess nares and determine need for appliance change or resting period.   Outcome: Progressing

## 2022-11-30 NOTE — CARE COORDINATION
11/30/22 1740   Service Assessment   Patient Orientation Alert and Oriented   Cognition Alert   History Provided By Patient; Child/Family   Primary Caregiver Self   Accompanied By/Relationship Daughter   Support Systems Children;Family Members   Patient's Healthcare Decision Maker is: Legal Next of Fabian Joseph   PCP Verified by CM Yes   Last Visit to PCP Within last 3 months   Prior Functional Level Independent in ADLs/IADLs   Current Functional Level Assistance with the following:;Cooking;Housework; Shopping   Can patient return to prior living arrangement Other (see comment)  (Will go to her daughter's house at discharge.)   Ability to make needs known: Good   Family able to assist with home care needs: Yes   Would you like for me to discuss the discharge plan with any other family members/significant others, and if so, who? No   Financial Resources [employer] Other (Comment)  (Meals on Wheels)   Discharge Planning   Type of Residence Trailer/Mobile Home   Living Arrangements Alone   Current Services Prior To Admission Durable Medical Equipment   Current DME Prior to Arrival   (Pt states that she has a cane and a shower chair from a prior medical condition but does not use them currently.)   Potential Assistance Needed Home Care   DME Ordered? No   Potential Assistance Purchasing Medications No   Patient expects to be discharged to: House   One/Two Story Residence One story   History of falls? 1   Services At/After Discharge   Transition of Care Consult (CM Consult) Discharge Planning   Services At/After Discharge Home Health   Confirm Follow Up Transport Other (see comment)  (Family)   Condition of Participation: Discharge Planning   The Plan for Transition of Care is related to the following treatment goals: Pt's goal is to go to her daughter's home. The Patient and/or Patient Representative was provided with a Choice of Provider?  Patient Representative   Name of the Patient Representative who was provided with the Choice of Provider and agrees with the Discharge Plan? Daughter, Sharma Brittle. The Patient and/Or Patient Representative agree with the Discharge Plan? Yes   Freedom of Choice list was provided with basic dialogue that supports the patient's individualized plan of care/goals, treatment preferences, and shares the quality data associated with the providers? Yes     Pt's plan is to dtr's home with [facility], SNF declined, will have transportation. Post Acute Facility/Agency List     Provided child with the following list, the list includes the overall star ratings obtained from CMS per the Medicare Web site (www.Medicare.gov):     [] [address]  [] Acute Inpatient Rehabilitation Facilities  [] Skilled Nursing Facilities  [x] Home Care    Provided verbal instructions on how to utilize the QR Code to obtain additional detailed star ratings from www. Medicare. gov     offered to print and provide the detailed list: To review list and will let  know if they would like a detailed list.

## 2022-11-30 NOTE — ED NOTES
Pt to [address] on transport monitor with writer and SARAH Segal, [facility], RN  11/29/22 8587

## 2022-11-30 NOTE — PROGRESS NOTES
PROGRESS NOTE          PATIENT NAME: Zainab Wolf Albion RECORD NO. [id number]  DATE: 11/30/2022  SURGEON: Dr Salcedo Eye: No primary care provider on file. HD: # 1    ASSESSMENT    Patient Active Problem List   Diagnosis    Fall at home, initial encounter   Li Forde is a 78 y.o. female who presents after a fall down a flight of stairs. Patient reportedly had an unwitnessed fall down a flight of approximately 10 concrete steps and was found down following, GCS14. CT head showed large left posterior SDH and right SAH. She was initially evaluated at [facility] where CT and CTA were both done. Patient was transferred to [facility] for evaluation by trauma due to the brain bleeds. Last night, she had a HA and became nauseous and started vomiting. Endovascular was consulted for second opinion on CTA and evaluation for need for DSA. Right SAH and bilateral SDH with minimal rightward midline shift     MEDICAL DECISION MAKING AND PLAN  NEURO:     DNR-CCA   GCS 9 (2,3,4)   -Injury: Right SAH and bilateral SDH with minimal rightward midline shift    DDVAP was given, repeat CT and CTA stable in 6 hrs. CTLS precaution. -NS: pending endovascular    -Endovascular: evaluate need for  DSA   Hold all antiplatelets and anticoagulants   -Med: On Keppra 500 BID    -Pain: . MMT: tylenol 1000q8  -Sedation: none    2. CV:  -SBPs: 106-144 (goal<160)  -MAP: >65  -HR: 68-82   -Home meds: held lisinopril, hydrochlorothiazide  -Not on levo or vaso      3. HEME:   -Hgb: 13.2   -Platelets: 774   -INR: 1    4. RESP:  -IS: encourage  -PIC: none    5. GI  -Diet: NPO okay sip and water  -Bowel regimen: glycolax, senokot    6. RENAL   -UOP:  2L mL/kg/hr   -IVF: NS 50 ml/hr   -BUN/Cr: 10/0.7   -Na/K: 129/3.7   -Mg/P: 1.7/3.1   -iCa: 1.11    7. MSK:    -Weight bearing status:CTLS precaution  -Pending TERT when patient is more alert and oriented    -PT/OT  8.    ID  -Tmax: 36.5  -WBC: 16.4 -Abx: none    9. ENDO   -B  -Insulin: none    10. LDAs  - 2 PIV, bridges ()    11. PPX:  -DVT: hold per NS  -GI: none    12. CONSULTS   -NS, endovascular    13. DISPO:    -TICU      Chief Complaint: \"headache\"    SUBJECTIVE    Chung Alcantara is seen and examined this morning. It's my first encounter with patient, reported per night team her GCS was 15 overnight. During exam this morning, patient was not awake and not as arousable, family also noticed that. Notified NS and pending rec      OBJECTIVE  VITALS: Temp: Temp: 96.8 °F (36 °C)Temp  Av.4 °F (36.3 °C)  Min: 96.8 °F (36 °C)  Max: 97.7 °F (86.9 °C) BP Systolic (72UDF), XXK:651 , Min:105 , WNP:903   Diastolic (04IBA), XHP:32, Min:46, Max:82   Pulse Pulse  Av.6  Min: 66  Max: 83 Resp Resp  Av.6  Min: 13  Max: 31 Pulse ox SpO2  Av.3 %  Min: 94 %  Max: 100 %  GENERAL: sleepy, only mild alert, lethargic, mildly arousable to voice  NEURO: GCS 7 did not open her eye, withdrawal with pain and able to wriggle toes. Did not speak to me nor family as well. HEENT: normocephalic, c-collar in placed  : normal, has bridges  LUNGS: clear to auscultation bilaterally- no wheezes, rales or rhonchi, normal air movement, no respiratory distress and clear to ausculation, without wheezes, rales or rhonci  HEART: normal rate and regular rhythm  ABDOMEN: soft, non-tender, non-distended, bowel sounds present in all 4 quadrants, and no guarding or peritoneal signs present  EXTREMITY: no cyanosis, clubbing or edema    I/O last 3 completed shifts: In: 0   Out:  [[id number]]    Drain/tube output:   In: 0   Out:  [[id number]]    LAB:  CBC:   Recent Labs     22   WBC 16.4*   HGB 13.2   HCT 38.6   MCV 91.5        BMP:   Recent Labs     22  2117 22  0353 22  0501   * 129* 129*   K 3.9 4.0 3.7   CL 90* 94* 94*   CO2 23 21 20   BUN 13 10 10   CREATININE 0.74 0.66 0.70   GLUCOSE 116* 135* 126*     COAGS: Recent Labs     11/29/22 2117   APTT 25.0   INR 1.0       RADIOLOGY:  CXR:   CT HEAD WO CONTRAST    Result Date: 11/30/2022  No significant interval change since the exam approximately 7 hours earlier with fairly extensive extra-axial hemorrhage as described above. CT HEAD WO CONTRAST    Result Date: 11/29/2022  Right subarachnoid and bilateral subdural hematomas as described with minimal rightward midline shift. Mild diffuse cerebral atrophy and chronic white matter ischemic change. Left posterolateral scalp hematoma. Critical results were called by Dr. Venessa Jung to Dr. Brent Valdivia On 11/29/2022 at 22:37. CT CERVICAL SPINE WO CONTRAST    Result Date: 11/29/2022  1. No acute fracture of the cervical spine. 2. Multilevel degenerative change. 3. Straightening of the normal cervical lordosis. 4. 1.2 cm right parotid lesion is indeterminate, possibly an intraparotid lymph node. Nonemergent follow-up ultrasound may be useful for further evaluation. CTA HEAD NECK W CONTRAST    Result Date: 11/30/2022  Unremarkable CTA of the head and neck. Intracranial hemorrhage present. Please refer to the report for the dedicated noncontrast head CT. Left posterolateral scalp hematoma. CT CHEST ABDOMEN PELVIS W CONTRAST Additional Contrast? None    Result Date: 11/29/2022  1. No acute intrathoracic abnormality. 2. No acute abdominal abnormality. 3. No acute abnormality in the thoracic or lumbar spines. 4. Edema/contusion in the left upper thigh soft tissues. 5. Hepatic steatosis. 6. Diverticulosis without diverticulitis. CT LUMBAR SPINE TRAUMA RECONSTRUCTION    Result Date: 11/29/2022  1. No acute intrathoracic abnormality. 2. No acute abdominal abnormality. 3. No acute abnormality in the thoracic or lumbar spines. 4. Edema/contusion in the left upper thigh soft tissues. 5. Hepatic steatosis. 6. Diverticulosis without diverticulitis. CT THORACIC SPINE TRAUMA RECONSTRUCTION    Result Date: 11/29/2022  1.  No acute intrathoracic abnormality. 2. No acute abdominal abnormality. 3. No acute abnormality in the thoracic or lumbar spines. 4. Edema/contusion in the left upper thigh soft tissues. 5. Hepatic steatosis. 6. Diverticulosis without diverticulitis. Supriya Davila, DO  11/30/22, 8:55 AM        Trauma Attending Bright Sanchez      I have reviewed the above GCS note(s) and confirmed the key elements of the medical history and physical exam. I have seen and examined the pt. I have discussed the findings, established the care plan and recommendations with Resident.   GCS decreasing and waxing waning mentation-  which is decreased from last night will get repeat CT scan now   May need EEG   On keppra   Tylenol   IVF   Discuss goals of care with family - [id number] [facility], DO  11/30/2022  9:54 AM

## 2022-11-30 NOTE — PROGRESS NOTES
Occupational [address]  Occupational Therapy Not Seen Note    DATE: 2022    NAME: Molly Mercedes  MRN: [id number]   : 1940      Patient not seen this date for Occupational Therapy due to:    Strict Bedrest: CTLS prec. In place currently, hold OT eval.    Next Scheduled Treatment: Attempt in pm or  as appropriate.     Electronically signed by Doug Pryor OT on 2022 at 7:13 AM

## 2022-11-30 NOTE — CONSULTS
Palliative Care Inpatient Consult    NAME:  Mykel Garrido RECORD NUMBER:  [id number]  AGE: 78 y.o. GENDER: female  : 1940  TODAY'S DATE:  2022    Reasons for Consultation:    Symptom and/or pain management  Provision of information regarding PC and/or hospice philosophies  Complex, time-intensive communication and interdisciplinary psychosocial support  Clarification of goals of care and/or assistance with difficult decision-making  Guidance in regards to resources and transition(s)    Members of PC team contributing to this consultation are:  Pallavi Sanz DO - fellow  Estevan Gilbert MD - attending    Plan      Palliative Interaction:  The palliative care service was able to meet with the patient this afternoon. She is currently resting in bed, but is somewhat confused. She does wax and wane in her mentation. We introduced ourselves as the palliative care service and explained our role in her care. The patient does understand that she is in [facility], but she does not really fully grasp her illness at this time. During our discussion, the patient's son, Brianna Enriquez, arrived. His son, Adan Hernandez, was also present. He states that the patient has 3 children. One lives away from the [facility]. Two of them are local. She is . She lives alone and was still driving before her fall. Her son reports that his sister has gone home to obtain healthcare POA paperwork and living will paperwork. This allegedly names the patient's daughter Dana Shrestha as her healthcare POA. We have discussed with the patient's RN that this will need to be scanned in once it is brought in. We also discussed that the patient has been made a DNR-CCA with the patient's son. He states that the family leans on [place] for these decisions, but is aware that she is currently a DNR-CCA. We discussed that we will continue to follow the patient and support the family as her hospitalization progresses.     Education/support to family  Education/support to patient  Code status clarified: DNRCCA    1- Symptom management/ pain control     Pain Assessment:  The patient is currently without headache. Anxiety:  none                          Dyspnea:  none                          Fatigue:  none    Other: none    We feel the patient symptoms are being controlled. Her current regimen is reviewed by myself and discussed with the staff. We recommend adjusting her medications as follows: no changes from a palliative care standpoint. 2- Goals of care evaluation   The patient goals of care are Cure, family support, live longer   Goals of care discussed with:    [] Patient independently    [x] Patient and Family    [] Family or Healthcare DPOA independently    [] Unable to discuss with patient, family/DPOA not present    3- Code Status  DNR-CCA    4- Other recommendations   - We will continue to provide comfort and support to the patient and the family    Palliative Care will continue to follow Ms. Steve's care as needed. Thank you for allowing Palliative Care to participate in the care of Ms. Anjali Amaral . History of Present Illness     The patient is a 78 y.o. female who initially presented to [facility] after a fall at home. CT imaging revealed a large left posterior subdural hematoma and a right subarachnoid hemorrhage. She was subsequently transferred to [facility] for further evaluation. Endovascular neurosurgery was consulted for a second opinion. Neurology and neuro critical care were also involved. Trauma ICU is currently admitted the patient. She was made a DNR CCA in the ER. Palliative care has been consulted to establish goals of care with the family. Referred to Palliative Care by   [x] Physician   [] Nursing  [] Family Request   [] Other:       Active Hospital Problems    Diagnosis Date Noted    Subdural hematoma [S06. 5XAA] 11/30/2022     Priority: Medium    Traumatic subarachnoid hemorrhage with loss of consciousness of 30 minutes or less [facility]) [S06.6X1A] 11/30/2022     Priority: Medium    Fall at home, initial encounter [CRUZLoree Mike, F43.993] 11/29/2022     Priority: Medium       PAST MEDICAL HISTORY      Diagnosis Date    Non-smoker        PAST SURGICAL HISTORY  No past surgical history on file. SOCIAL HISTORY       FAMILY HISTORY  No family history on file. ALLERGIES  Allergies   Allergen Reactions    Keflex [Cephalexin] Hives    Codeine Nausea And Vomiting    Penicillins Rash       MEDICATIONS  Current Medications    latanoprost  1 drop Both Eyes Nightly    timolol  1 drop Both Eyes BID    [Held by provider] lisinopril  10 mg Oral Daily    [Held by provider] hydroCHLOROthiazide  25 mg Oral Daily    senna  1 tablet Oral Nightly    levETIRAcetam  500 mg IntraVENous Q12H    propranolol  10 mg Oral TID    polyethylene glycol  17 g Oral Daily    acetaminophen  1,000 mg Oral 3 times per day     sodium chloride flush, ondansetron **OR** ondansetron  Home Medications  No current facility-administered medications on file prior to encounter. Current Outpatient Medications on File Prior to Encounter   Medication Sig Dispense Refill    lisinopril (PRINIVIL;ZESTRIL) 10 MG tablet Take 10 mg by mouth daily      hydroCHLOROthiazide (HYDRODIURIL) 25 MG tablet Take 25 mg by mouth daily      ALPRAZolam (XANAX) 0.25 MG tablet Take 0.25 mg by mouth nightly as needed for Sleep (BID as needed).       acetaminophen (TYLENOL) 500 MG tablet Take 500 mg by mouth every 6 hours as needed for Pain      aspirin 81 MG chewable tablet Take 81 mg by mouth daily      bimatoprost (LUMIGAN) 0.01 % SOLN ophthalmic drops Place 1 drop into both eyes nightly      timolol (BETIMOL) 0.25 % ophthalmic solution Place 1-2 drops into both eyes 2 times daily         Data         BP (!) 129/59   Pulse 71   Temp 97.7 °F (36.5 °C) (Axillary)   Resp 16   Ht 4' 9\" (1.448 m)   Wt 145 lb (65.8 kg)   SpO2 99%   BMI 31.38 kg/m²     Wt Readings from Last 3 Encounters:   11/30/22 145 lb (65.8 kg)        Code Status: DNR-CCA     ADVANCED CARE PLANNING:  Patient has capacity for medical decisions: intermittently, currently somewhat confused  Health Care Power of : yes, reportedly has HPOA paperwork at home which names one of her daughter's as her POA. This paperwork is being brought in  Living Will: Yes, being brought in    Personal, Social, and Family History  Marital Status:   Living situation: alone  Importance of rigoberto/Moravian/spiritual beliefs: not asked  Psychological Distress: not asked  Does patient understand diagnosis/treatment? not asked  Does caregiver understand diagnosis/treatment?  yes    Assessment        REVIEW OF SYSTEMS  CONSTITUTIONAL:  negative for fevers, chills, sweats, fatigue, weight loss  HEENT:  negative for vision, hearing changes, runny nose, throat pain  RESPIRATORY:  negative for shortness of breath, cough, congestion, wheezing  CARDIOVASCULAR:  negative for chest pain, palpitations  GASTROINTESTINAL:  negative for nausea, vomiting, diarrhea, constipation, change in bowel habits, abdominal pain   GENITOURINARY:  negative for difficulty of urination, burning with urination, frequency   INTEGUMENT:  negative for rash, skin lesions, easy bruising   HEMATOLOGIC/LYMPHATIC:  negative for swelling/edema   ALLERGIC/IMMUNOLOGIC:  negative for urticaria , itching  ENDOCRINE:  negative increase in drinking, increase in urination, hot or cold intolerance  MUSCULOSKELETAL:  negative joint pains, muscle aches, swelling of joints  NEUROLOGICAL:  reports improvement of headache; negative for dizziness, lightheadedness, numbness, pain, tingling extremities  BEHAVIOR/PSYCH:  negative for depression, anxiety    PHYSICAL ASSESSMENT:  General Appearance: alert, ill appearing,  female, in no acute distress resting supine in bed  Mental status: oriented to place and person  Head: normocephalic, atraumatic  Eye: no icterus, redness, pupils equal and reactive, extraocular eye movements intact, conjunctiva clear  Ear: normal external ear, no discharge, hearing intact  Nose: no drainage noted  Mouth: mucous membranes moist, poor oral dentition  Neck: supple, no carotid bruits, thyroid not palpable  Lungs: Bilateral equal air entry, clear to ausculation, no wheezing, rales or rhonchi, normal effort  Cardiovascular: normal rate, regular rhythm, no murmur, gallop, rub  Abdomen: Soft, nontender, nondistended, normal bowel sounds, no hepatomegaly or splenomegaly  Neurologic: somewhat slurred speech  Skin: No gross lesions, rashes, bruising or bleeding on exposed skin area  Extremities: peripheral pulses palpable, no pedal edema or calf pain with palpation  Psych: normal affect    Palliative Performance Scale:  ___100% Full ambulation; normal activity/No disease; full self-care; normal intake; LOC full  ___90% full ambulation; normal activity/some disease; full self-care; normal intake; LOC full  ___80% ambulation full; normal activity with effort/some disease; full self-care; normal/reduced intake; LOC full  ___70% ambulation reduced; cannot do normal work/some disease; full self-care; normal/reduce intake; LOC full  ___60%  Ambulation reduced; Significant disease; Can't do hobbies/housework; intake normal or reduced; occasional assist; LOC full/confusion  ___50%  Mainly sit/lie; Extensive disease; Can't do any work; Considerable assist; intake normal or reduced; LOC full/confusion  _x_40%  Mainly in bed; Extensive disease; Mainly assist; intake normal or reduced; LOC full/confusion   ___30%  Bed Bound; Extensive disease; Total care; intake reduced; LOC full/confusion  ___20%  Bed Bound; Extensive disease; Total care; intake minimal; Drowsy/coma  ___10%  Bed Bound; Extensive disease;  Total care; Mouth care only; Drowsy/coma  ___0       Death    Principle Problem/Diagnosis:  Principal Problem:    Fall at home, initial encounter  Active Problems:    Subdural hematoma    Traumatic subarachnoid hemorrhage with loss of consciousness of 30 minutes or less (HCC)  Resolved Problems:    * No resolved hospital problems. *    Please call with any palliative questions or concerns. Palliative Care Team is available via perfect serve or via phone. This note has been dictated by dragon, typing errors may be a possibility. The total time I spent in seeing the patient, discussing goals of care, advanced directives, code status, greater than 50% time in counseling and other major issues was more than 60 minutes    Electronically signed by      Norman De Luna Fellow  [address] [place], [place]  11/30/2022 2:38 PM  Palliative care office: [phone]        ATTENDING ATTESTATION:    I have discussed the care of Pierce Bang, including pertinent history and exam findings, with the resident/fellow/NP. I have seen and examined the patient and the key elements of all parts of the encounter have been performed by me. I agree with the assessment, plan and orders as documented by the fellow/resident/NP, after I modified the exam findings and the plan of treatments and the final version is my approved version of the assessment.       Additional Comments:   Patient was seen today along with the fellow Dr. Del Melton  Patient appeared drowsy and somnolent, was able to answer some of the questions, did wax and wane in mentation  Patient Johnnie Garcia and his son Lolita Marquez came to visit the patient  Ed told that the patient was , has 3 living children, 2 daughters Glen Hahn and Valeria Elena and one son which is he himself and lives alone and has been driving prior to the fall  Ed said that the patient has been very independent doing everything by herself  Ed sad that patient has POA paperwork and her daughter Valeria Elena has gone to bring the paperwork and she id health POA for the patient  We explained the different types of codes tO Ed and he told that patient's daughter Cindy Quinteros deals with these issues but he does know that patient is DNR CCA   We offered comfort and support to the patient and family  We will continue to provide comfort and support to the patient and family      This note has been dictated by dragon, typing errors may be a possibility.   The total time I spent in seeing the patient, discussing goals of care, advanced directives, code status, greater than 50% time in counseling and other major issues was more than 60 minutes      Electronically signed by Smooth Parkinson MD on 11/30/2022 at 3:01 PM

## 2022-11-30 NOTE — H&P
TRAUMA HISTORY AND PHYSICAL EXAMINATION    PATIENT NAME: Aniya Capone  YOB: 1940  MEDICAL RECORD NO. [id number]   DATE: 11/30/2022  PRIMARY CARE PHYSICIAN: No primary care provider on file. PATIENT EVALUATED AT THE REQUEST OF : Fermin    ACTIVATION   []Trauma Alert     [x] Trauma Priority     []Trauma Consult. IMPRESSION:     Patient Active Problem List   Diagnosis    Fall at home, initial encounter       MEDICAL DECISION MAKING AND PLAN:       Right subarachnoid hemorrhage  Bilateral subdural hematoma with minimal rightward shift  Scalp hematoma  - admit to ICU  - DDAVP and Keppra given per neurosurgery  - repeat head CT in 6 hours   - CTLS precautions  - neuro checks  - hold AC/AP  - recommend neuroendovascular consult      Berto Cardozo 89    [x] Neurosurgery     [] Orthopedic Surgery    [] Cardiothoracic     [] Facial Trauma    [] Plastic Surgery (Burn)    [] Pediatric Surgery     [] Internal Medicine    [] Pulmonary Medicine    [x] Other: neuroendovascular surgery       HISTORY:     Chief Complaint:  \"headache\"    INJURY SUMMARY  Right subarachnoid hemorrhage  Bilateral subdural hematoma with minimal rightward shift  Scalp hematoma    If intracranial hemorrhage is present, is it a:  [] BIG 1  [] BIG 2  [x] BIG 3    GENERAL DATA  Age 78 y.o.  female   Patient information was obtained from patient and EMS personnel. History/Exam limitations: confusion.   Patient presented to the Emergency Department by ambulance where the patient received see Ambulance Run Sheet prior to arrival.  Injury Date: 11/30/2022   Approximate Injury Time: this afternoon        Transport mode:   []Ambulance      [] Helicopter     []Car       [] Other  Referring Hospital: [facility] [address], (e.g., home, farm, industry, street)  Specific Details of Location (e.g., bedroom, kitchen, garage): [facility]  Type of Residence (if occurred in home setting) (e.g., apartment, mobile home, single family home): none    MECHANISM OF INJURY    [] Motor Vehicle Collision   Specific vehicle type involved (e.g., sedan, minivan, SUV, pickup truck):   Collision with (e.g., type of vehicle, building, barn, ditch, tree):     Type of collision  [] Single Vehicle Collision  []Multiple Vehicle Collision  [] unknown collision type    Mechanism considerations  [] Fatality in Same Vehicle      []Ejected       []Rollover          []Extricated    Internal Compartment   []                      []Passenger:      []Front Seat        []Rear [address]  [] Unrestrained   []Lap Belt Only Restrained   [] Shoulder Belt Only Restrained  [] 3 Point Restrained  [] unknown     Air Bags  [] Front Air Bag  []Side Air Bag  []Curtain Airbag []Air Bag Not Deployed    []No Air Bag equipped in vehicle      Pediatric Consideration:      [] Booster Seat  []Infant Car Seat  [] Child Car Seat      [] Motorcycle Collision   Wearing Helmet     []Yes     []No    []Unknown    [] ATV crash  Wearing Helmet     []Yes     []No    []Unknown    [] Bicycle Collision Wearing Helmet     []Yes     []No    []Unknown    [] Pedestrian Struck         [x] Fall    []From Standing     []From Height  Ft     [x]Down Stairs _unknown__steps    [] Assault    [] Gunshot  Specify caliber / type of gun: ____________________________    [] Stabbing  Specify weapon type, size: _____________________________    [] Burn  []Flame   []Scald   []Electrical   []Chemical  []Inhalation   []House fire    [] Other ______________________________________________________    [] Other protective devices used / worn ___________________________    HISTORY:     Molly Mercedes is a 78 y.o. female that presented to the Emergency Department following fall down an unknown number of steps at [employer] earlier today. Patient was seen and evaluated at [facility] where she was found to have SDH and SAH. Patient was transferred to [facility] for further evaluation.  Upon arrival, patient is GCS 14 d/t confusion. States she has a HA and repeatedly attempts to get out of bed. Patient became nauseous and started vomiting. History limited secondary to patient's mental status    Loss of Consciousness []No   []Yes Duration(min)       [x] Unknown     Total Fluids Given Prior To Arrival  mL    MEDICATIONS:   []  None     []  Information not available due to exam limitations documented above    Prior to Admission medications    Medication Sig Start Date End Date Taking? Authorizing Provider   lisinopril (PRINIVIL;ZESTRIL) 10 MG tablet Take 10 mg by mouth daily   Yes Historical Provider, MD   hydroCHLOROthiazide (HYDRODIURIL) 25 MG tablet Take 25 mg by mouth daily   Yes Historical Provider, MD   ALPRAZolam (XANAX) 0.25 MG tablet Take 0.25 mg by mouth nightly as needed for Sleep (BID as needed). Yes Historical Provider, MD   acetaminophen (TYLENOL) 500 MG tablet Take 500 mg by mouth every 6 hours as needed for Pain   Yes Historical Provider, MD   aspirin 81 MG chewable tablet Take 81 mg by mouth daily   Yes Historical Provider, MD   bimatoprost (LUMIGAN) 0.01 % SOLN ophthalmic drops Place 1 drop into both eyes nightly   Yes Historical Provider, MD   timolol (BETIMOL) 0.25 % ophthalmic solution Place 1-2 drops into both eyes 2 times daily   Yes Historical Provider, MD       ALLERGIES:   []  None    []   Information not available due to exam limitations documented above     Keflex [cephalexin], Codeine, and Penicillins    PAST MEDICAL HISTORY: []  None   []   Information not available due to exam limitations documented above      has a past medical history of Non-smoker. has no past surgical history on file. FAMILY HISTORY   []   Information not available due to exam limitations documented above    family history is not on file.     SOCIAL HISTORY  []   Information not available due to exam limitations documented above     has no history on file for tobacco use.   has no history on file for alcohol use.   has no history on file for drug use. Review of Systems:    []   Information not available due to exam limitations documented above    Review of Systems   Unable to perform ROS: Mental status change   Neurological:  Positive for headaches. Hematological:  Does not bruise/bleed easily. Psychiatric/Behavioral:  Positive for confusion. PHYSICAL EXAMINATION:     GLASCOW COMA SCALE  NEUROMUSCULAR BLOCKADE PRIOR TO ARRIVAL     [x]No        []Yes      Variable  Score   Variable  Score  Eye opening [x]Spontaneous 4 Verbal  []Oriented  5     []To voice  3   [x]Confused  4    []To pain  2   []Inapp words  3    []None  1   []Incomp words 2       []None  1   Motor   [x]Obeys  6    []Localizes pain 5    []Withdraws(pain) 4    []Flexion(pain) 3  []Extension(pain) 2    []None  1     GCS Total = 14    PHYSICAL EXAMINATION    VITAL SIGNS:   Vitals:    11/30/22 0300   BP: 105/74   Pulse: 70   Resp: 14   Temp:    SpO2: 97%       Physical Exam  Vitals and nursing note reviewed. Constitutional:       General: She is not in acute distress. HENT:      Head: Normocephalic and atraumatic. Nose: Nose normal.   Eyes:      Extraocular Movements: Extraocular movements intact. Pupils: Pupils are equal, round, and reactive to light. Neck:      Comments: In cervical collar  Cardiovascular:      Rate and Rhythm: Normal rate and regular rhythm. Pulses: Normal pulses. Pulmonary:      Effort: Pulmonary effort is normal. No respiratory distress. Abdominal:      General: There is no distension. Palpations: Abdomen is soft. Tenderness: There is no abdominal tenderness. There is no guarding or rebound. Musculoskeletal:         General: No swelling, tenderness, deformity or signs of injury. Normal range of motion. Skin:     General: Skin is warm and dry. Neurological:      Mental Status: She is alert. Sensory: No sensory deficit. Motor: No weakness.       Comments: Confused and agitated, repeatedly trying to get out of bed, A&Ox2        FOCUSED ABDOMINAL SONOGRAM FOR TRAUMA (FAST): A fair  quality examination was performed by Dr. Emani Felix and representative images were obtained. [x] No free fluid in the abdomen   [] Free fluid in RUQ   [] Free fluid in LUQ  [] Free fluid in Pelvis  [] Pericardial fluid  [] Other:        RADIOLOGY  CT THORACIC SPINE TRAUMA RECONSTRUCTION   Final Result   1. No acute intrathoracic abnormality. 2. No acute abdominal abnormality. 3. No acute abnormality in the thoracic or lumbar spines. 4. Edema/contusion in the left upper thigh soft tissues. 5. Hepatic steatosis. 6. Diverticulosis without diverticulitis. CT LUMBAR SPINE TRAUMA RECONSTRUCTION   Final Result   1. No acute intrathoracic abnormality. 2. No acute abdominal abnormality. 3. No acute abnormality in the thoracic or lumbar spines. 4. Edema/contusion in the left upper thigh soft tissues. 5. Hepatic steatosis. 6. Diverticulosis without diverticulitis. CT CHEST ABDOMEN PELVIS W CONTRAST Additional Contrast? None   Final Result   1. No acute intrathoracic abnormality. 2. No acute abdominal abnormality. 3. No acute abnormality in the thoracic or lumbar spines. 4. Edema/contusion in the left upper thigh soft tissues. 5. Hepatic steatosis. 6. Diverticulosis without diverticulitis. CT HEAD WO CONTRAST   Final Result   Right subarachnoid and bilateral subdural hematomas as described with minimal   rightward midline shift. Mild diffuse cerebral atrophy and chronic white matter ischemic change. Left posterolateral scalp hematoma. Critical results were called by Dr. Mclaughlin Nicely to Dr. Emani Felix On 11/29/2022   at 22:37. CT CERVICAL SPINE WO CONTRAST   Final Result   1. No acute fracture of the cervical spine. 2. Multilevel degenerative change. 3. Straightening of the normal cervical lordosis.    4. 1.2 cm right parotid lesion is indeterminate, possibly an intraparotid   lymph node. Nonemergent follow-up ultrasound may be useful for further   evaluation.          CT HEAD WO CONTRAST    (Results Pending)   CTA HEAD NECK W CONTRAST    (Results Pending)         LABS    Labs Reviewed   TRAUMA PANEL - Abnormal; Notable for the following components:       Result Value    WBC 16.4 (*)     Est, Glom Filt Rate 56 (*)     Glucose 116 (*)     Sodium 125 (*)     Chloride 90 (*)     O2 Sat, Albin 57.4 (*)     All other components within normal limits   VITAMIN D 25 HYDROXY - Abnormal; Notable for the following components:    Vit D, 25-Hydroxy 16.5 (*)     All other components within normal limits   URINALYSIS - Abnormal; Notable for the following components:    Specific Gravity, UA 1.052 (*)     All other components within normal limits   MRSA DNA PROBE, NASAL   ALBUMIN   VITAMIN B12   TROP/MYOGLOBIN   T4, FREE   TSH   HEMOGLOBIN M4O   BASIC METABOLIC PANEL W/ REFLEX TO MG FOR LOW K   CBC WITH AUTO DIFFERENTIAL   MAGNESIUM   PHOSPHORUS   CALCIUM, IONIZED   BASIC METABOLIC PANEL   POCT GLUCOSE   POCT GLUCOSE   TYPE AND SCREEN         Shadi Ramon DO  11/29/22, 4:27 AM

## 2022-11-30 NOTE — PLAN OF CARE
Problem: Discharge Planning  Goal: Discharge to home or other facility with appropriate resources  Outcome: Progressing  Flowsheets  Taken 11/30/2022 0400  Discharge to home or other facility with appropriate resources:   Identify barriers to discharge with patient and caregiver   Arrange for needed discharge resources and transportation as appropriate   Identify discharge learning needs (meds, wound care, etc)  Taken 11/29/2022 2240  Discharge to home or other facility with appropriate resources:   Identify barriers to discharge with patient and caregiver   Arrange for needed discharge resources and transportation as appropriate   Identify discharge learning needs (meds, wound care, etc)     Problem: Pain  Goal: Verbalizes/displays adequate comfort level or baseline comfort level  Outcome: Progressing     Problem: Safety - Adult  Goal: Free from fall injury  Outcome: Progressing     Problem: ABCDS Injury Assessment  Goal: Absence of physical injury  Outcome: Progressing

## 2022-11-30 NOTE — CONSULTS
Department of Neurosurgery                                       Resident Consult Note      Reason for Consult:  SDH, [address]  Requesting Physician:  Dr. Cadence Theodore  Neurosurgeon:   []Dr. Adair Sandoval  []Dr. Shannon Mandujano  [x]Dr. Yessenia Raya     History Obtained From:  patient, electronic medical record    CHIEF COMPLAINT:         Fall, SDH, SAH    HISTORY OF PRESENT ILLNESS:       The patient is a 78 y.o. female who presents with SDH and SAH after a fall. She fell at [employer] down a flight of steps earlier today. She is on ASA. She endorses mild headache but denies neck pain. Patient was transferred from [facility] where she received a CT head and CTA head/neck that showed SDH and SAH and radiology read CTA as no sizable saccular aneurysm and no large vessel occlusion. She is maintaining her airway on arrival to the trauma bay, oriented to self and time. PAST MEDICAL HISTORY :       Past Medical History:        Diagnosis Date    Non-smoker        Past Surgical History:    No past surgical history on file. Social History:   Social History     Socioeconomic History    Marital status: Not on file     Spouse name: Not on file    Number of children: Not on file    Years of education: Not on file    Highest education level: Not on file   Occupational History    Not on file   Tobacco Use    Smoking status: Not on file    Smokeless tobacco: Not on file   Substance and Sexual Activity    Alcohol use: Not on file    Drug use: Not on file    Sexual activity: Not on file   Other Topics Concern    Not on file   Social History Narrative    Not on file     Social Determinants of Health     Financial Resource Strain: Not on file   Food Insecurity: Not on file   Transportation Needs: Not on file   Physical Activity: Not on file   Stress: Not on file   Social Connections: Not on file   Intimate Partner Violence: Not on file   Housing Stability: Not on file       Family History:   No family history on file.     Allergies:  Keflex [cephalexin], Codeine, and Penicillins    Home Medications:  Prior to Admission medications    Medication Sig Start Date End Date Taking? Authorizing Provider   lisinopril (PRINIVIL;ZESTRIL) 10 MG tablet Take 10 mg by mouth daily   Yes Historical Provider, MD   hydroCHLOROthiazide (HYDRODIURIL) 25 MG tablet Take 25 mg by mouth daily   Yes Historical Provider, MD   ALPRAZolam (XANAX) 0.25 MG tablet Take 0.25 mg by mouth nightly as needed for Sleep (BID as needed).    Yes Historical Provider, MD   acetaminophen (TYLENOL) 500 MG tablet Take 500 mg by mouth every 6 hours as needed for Pain   Yes Historical Provider, MD   aspirin 81 MG chewable tablet Take 81 mg by mouth daily   Yes Historical Provider, MD   bimatoprost (LUMIGAN) 0.01 % SOLN ophthalmic drops Place 1 drop into both eyes nightly   Yes Historical Provider, MD   timolol (BETIMOL) 0.25 % ophthalmic solution Place 1-2 drops into both eyes 2 times daily   Yes Historical Provider, MD       Current Medications:   Current Facility-Administered Medications: sodium chloride flush 0.9 % injection 5-40 mL, 5-40 mL, IntraVENous, 2 times per day  sodium chloride flush 0.9 % injection 5-40 mL, 5-40 mL, IntraVENous, PRN  0.9 % sodium chloride infusion, , IntraVENous, PRN  ondansetron (ZOFRAN-ODT) disintegrating tablet 4 mg, 4 mg, Oral, Q8H PRN **OR** ondansetron (ZOFRAN) injection 4 mg, 4 mg, IntraVENous, Q6H PRN  polyethylene glycol (GLYCOLAX) packet 17 g, 17 g, Oral, Daily  0.9 % sodium chloride infusion, , IntraVENous, Continuous  famotidine (PEPCID) 20 mg in sodium chloride (PF) 0.9 % 10 mL injection, 20 mg, IntraVENous, BID  acetaminophen (TYLENOL) tablet 1,000 mg, 1,000 mg, Oral, 3 times per day    REVIEW OF SYSTEMS:       CONSTITUTIONAL: negative for fatigue and malaise   EYES: negative for double vision and photophobia    HEENT: negative for tinnitus and sore throat   RESPIRATORY: negative for cough, shortness of breath   CARDIOVASCULAR: negative for chest pain, palpitations   GASTROINTESTINAL: negative for nausea, vomiting   GENITOURINARY: negative for incontinence   MUSCULOSKELETAL: negative for neck or back pain   NEUROLOGICAL: negative for seizures   PSYCHIATRIC: positive for agitated     Review of systems otherwise negative. PHYSICAL EXAM:       BP (!) 106/53 Comment: within order parameters  Pulse 79   Temp 97.5 °F (36.4 °C) (Oral)   Resp 14   Ht 4' 9\" (1.448 m)   Wt 145 lb 11.2 oz (66.1 kg)   SpO2 97%   BMI 31.53 kg/m²     CONSTITUTIONAL:  Alert and oriented to self, time, place. GCS 14 for mild confusion, agitated, unable to lay still despite repeated commands. No dysarthria, no aphasia. EOMI.     HEAD:  normocephalic   EYES:  PERRLA, EOMI.   ENT:  moist mucous membranes   NECK:  supple, symmetric, no midline tenderness to palpation    BACK:  without midline tenderness, step-offs or deformities    LUNGS:  Equal air entry bilaterally   CARDIOVASCULAR:  normal s1 / s2   ABDOMEN:  Soft, no rigidity   NEUROLOGIC:  EYE OPENING     Spontaneous - 4 [x]       To voice - 3 []       To pain - 2 []       None - 1 []    VERBAL RESPONSE     Appropriate, oriented - 5 []       Dazed or confused - 4 [x]       Syllables, expletives - 3 []       Grunts - 2 []       None - 1 []    MOTOR RESPONSE     Spontaneous, command - 6 [x]       Localizes pain - 5 []       Withdraws pain - 4 []       Abnormal flexion - 3 []       Abnormal extension - 2 []       None - 1 []            Total GCS: 14    Mental Status:  A & O x3, awake             Cranial Nerves:    II: Visual fields:  normal  III: Pupils:  equal, round, reactive to light  III,IV,VI: Extra Ocular Movements: intact  VIII: Hearing:  intact  XI: Shoulder shrug:  intact    Motor Exam:    Drift:  absent  Tone:  normal    Motor exam is symmetrical 5 out of 5 all extremities bilaterally    Sensory:    Touch:    Right Upper Extremity:  normal  Left Upper Extremity:  normal  Right Lower Extremity:  normal  Left Lower Extremity: normal    Deep Tendon Reflexes:    Right Bicep:  2+  Left Bicep:  2+  Right Knee:  2+  Left Knee:  2+     SKIN:  no rash      LABS AND IMAGING:     CBC with Differential:    Lab Results   Component Value Date/Time    WBC 16.4 11/29/2022 09:17 PM    RBC 4.22 11/29/2022 09:17 PM    HGB 13.2 11/29/2022 09:17 PM    HCT 38.6 11/29/2022 09:17 PM     11/29/2022 09:17 PM    MCV 91.5 11/29/2022 09:17 PM    MCH 31.3 11/29/2022 09:17 PM    MCHC 34.2 11/29/2022 09:17 PM    RDW 12.7 11/29/2022 09:17 PM     BMP:    Lab Results   Component Value Date/Time     11/29/2022 09:17 PM    K 3.9 11/29/2022 09:17 PM    CL 90 11/29/2022 09:17 PM    CO2 23 11/29/2022 09:17 PM    BUN 13 11/29/2022 09:17 PM    LABALBU 4.3 11/29/2022 09:17 PM    CREATININE 0.74 11/29/2022 09:17 PM    LABGLOM 56 11/29/2022 09:17 PM    GLUCOSE 116 11/29/2022 09:17 PM       Radiology Review:    CT HEAD WO CONTRAST    Result Date: 11/29/2022  EXAMINATION: CT OF THE HEAD WITHOUT CONTRAST  11/29/2022 9:12 pm TECHNIQUE: CT of the head was performed without the administration of intravenous contrast. Automated exposure control, iterative reconstruction, and/or weight based adjustment of the mA/kV was utilized to reduce the radiation dose to as low as reasonably achievable. COMPARISON: None. HISTORY: ORDERING SYSTEM PROVIDED HISTORY: fall TECHNOLOGIST PROVIDED HISTORY: fall Reason for Exam: trauma fall Relevant Medical/Surgical History: pt received contrast 2 hours ago, trauma attending override due to neurological changes FINDINGS: BRAIN/VENTRICLES: Copious subarachnoid hemorrhage is present at the right anterior and lateral aspects of the cerebral hemisphere most prominently involving the sylvian fissure region. There is also superimposed anterior to anterolateral right convexity minor subdural hematoma. More prominent more diffuse left convexity subdural hematoma is present with maximum thickness of approximately 13 mm as measured on coronal image 55. There is resulting 2-3 mm rightward midline shift. The ventricular system is unremarkable. There is mild diffuse cerebral atrophy and chronic white matter ischemic change. ORBITS: The visualized portion of the orbits demonstrate no acute abnormality. SINUSES: The visualized paranasal sinuses and mastoid air cells demonstrate no acute abnormality. SOFT TISSUES/SKULL:  Osseous structures appear intact. This includes the lateral aspect of the left occipital bone. There is a small to moderate localized scalp hematoma at the left posterolateral aspect. Right subarachnoid and bilateral subdural hematomas as described with minimal rightward midline shift. Mild diffuse cerebral atrophy and chronic white matter ischemic change. Left posterolateral scalp hematoma. Critical results were called by Dr. Oscar Hamm to Dr. Bryson [place] On 11/29/2022 at 22:37. CT CERVICAL SPINE WO CONTRAST    Result Date: 11/29/2022  EXAMINATION: CT OF THE CERVICAL SPINE WITHOUT CONTRAST 11/29/2022 9:12 pm TECHNIQUE: CT of the cervical spine was performed without the administration of intravenous contrast. Multiplanar reformatted images are provided for review. Automated exposure control, iterative reconstruction, and/or weight based adjustment of the mA/kV was utilized to reduce the radiation dose to as low as reasonably achievable. COMPARISON: None. HISTORY: ORDERING SYSTEM PROVIDED HISTORY: fall TECHNOLOGIST PROVIDED HISTORY: fall Reason for Exam: trauma fall FINDINGS: Examination is mildly degraded by motion. BONES/ALIGNMENT: There is reversal the normal cervical lordosis. No significant listhesis. Vertebral body heights are maintained. No displaced fracture. DEGENERATIVE CHANGES: Multilevel degenerative changes of the cervical spine. SOFT TISSUES: There is no prevertebral soft tissue swelling. 1.2 cm right parotid nodule. 1. No acute fracture of the cervical spine. 2. Multilevel degenerative change.  3. Straightening of the normal cervical lordosis. 4. 1.2 cm right parotid lesion is indeterminate, possibly an intraparotid lymph node. Nonemergent follow-up ultrasound may be useful for further evaluation. ASSESSMENT AND PLAN:       Patient Active Problem List   Diagnosis    Fall at home, initial encounter         A/P:  This is a 78 y.o. female with right SAH and bilateral SDH with minimal rightward midline shift after fall down 13 steps. Patient care will be discussed with attending, will reevaluate patient along with attending     - DDAVP given   - Recommend neuroendovascular consultation    - Repeat CT head and CTA head/neck in 6 hours  - CTLS recommendations: CTLS precautions   - Neuro checks per protocol  - Hold all antiplatelets and anticoagulants  - We recommend SBP < 160   - Determine the lower limit of SBP clinically based on mentation    Additional recommendations may follow    Please contact neurosurgery with any changes in patients neurologic status. Thank you for your consult.        Fabiana Doyle MD   NS pager [phone]  11/30/2022  2:45 AM

## 2022-11-30 NOTE — PROGRESS NOTES
[facility]     Emergency/Trauma Note    PATIENT NAME: Rubin Heaton    Shift date: 11.29.2022  Shift day: Tuesday   Shift # 2    Room # 5895/6385-51   Name: Rubin Heaton            Age: 78 y.o. Gender: female          Anglican: No Orthodoxy on file   Place of [facility]: unknown    Trauma/Incident type: Adult Trauma Priority  Admit Date & Time: 11/29/2022  8:52 PM  TRAUMA NAME: Jose Martin Hudson CHRISTEN TRAUMA    ADVANCE DIRECTIVES IN CHART? No    NAME OF DECISION MAKER: None    RELATIONSHIP OF DECISION MAKER TO PATIENT: None    PATIENT/EVENT DESCRIPTION:  Rubin Heaton is a 78 y.o. female who arrived as a TRAUMA PRIORITY by [employer] and transfer from [facility] with a brain bleed. Pt to be admitted to [facility]/1009-. SPIRITUAL ASSESSMENT-INTERVENTION-OUTCOME:  Patient appears to be calm and coping. Family was brought bedside and coping but concerned.  provided space for feelings, thoughts, and concerns. Provided hospitality and assisted in getting family to ICU area. Determined family support to be available. Family receptive to spiritual care and support. PATIENT BELONGINGS:  No belongings noted    ANY BELONGINGS OF SIGNIFICANT VALUE NOTED:  None    REGISTRATION STAFF NOTIFIED? Yes      WHAT IS YOUR SPIRITUAL CARE PLAN FOR THIS PATIENT?:  Chaplains will remain available to offer spiritual and emotional support as needed.       Electronically signed by Haydee Brooks on 11/30/2022 at 2:15 AM.  [facility]  [phone]     11/29/22 2110   Encounter Summary   Service Provided For: Patient and family together   Referral/Consult From: Multi-disciplinary team   Support System Family members   Last Encounter  11/29/22   Complexity of Encounter Moderate   Begin Time 2110   End Time  2155   Total Time Calculated 45 min   Encounter    Type Initial Screen/Assessment   Crisis   Type Trauma  (Priority) Assessment/Intervention/Outcome   Assessment Calm;Coping   Intervention Active listening;Explored/Affirmed feelings, thoughts, concerns; Discussed illness injury and its impact   Outcome Engaged in conversation;Expressed Gratitude     Electronically signed by Ganesh Negron on 11/30/2022 at 2:15 AM

## 2022-11-30 NOTE — PROGRESS NOTES
Speech Language Pathology  Facility/Department: [facility] CAR 1- SICU  Initial Speech/Language/Cognitive Assessment    NAME: Barbi Martinez  : 1940   MRN: [id number]  ADMISSION DATE: 2022  ADMITTING DIAGNOSIS: has Fall at home, initial encounter on their problem list.    Date of Eval: 2022   Evaluating Therapist: Felicitas Mims    RECENT RESULTS  CT OF HEAD/MRI:   Right subarachnoid and bilateral subdural hematomas as described with minimal   rightward midline shift. Mild diffuse cerebral atrophy and chronic white matter ischemic change. Left posterolateral scalp hematoma. Primary Complaint: Barbi Martinez is a 78 y.o. female that presented to the Emergency Department following fall down an unknown number of steps at [employer] earlier today. Patient was seen and evaluated at [facility] where she was found to have SDH and SAH. Patient was transferred to [facility] for further evaluation. Upon arrival, patient is GCS 14 d/t confusion. States she has a HA and repeatedly attempts to get out of bed. Patient became nauseous and started vomiting. History limited secondary to patient's mental status      Pain:  Pain Assessment  Pain Assessment: Adult Nonverbal Pain Scale (NPVS)  Pain Level: 0      Vision/ Hearing  Vision  Vision: Within Functional Limits  Hearing  Hearing: Within functional limits      Assessment:  Pt presents with mild-severe expressive and receptive language deficits characterized by impaired ability to answer orientation questions, follow 2-step commands, answer moderate yes/no questions, complete sentences, and repeat words. Pt also presents with difficulties with confrontational naming, responsive naming, and divergent naming. Pt presents with significant tangential speech and required multiple repetitions of directions throughout evaluation. Pt's daughter present at time of evaluation and reported that she has noticed difficulties with memory before admission.   Pt with no dysarthria. ST to follow up and provide treatment to address noted deficits. Education provided. Recommendations:  Recommendations  Requires SLP Intervention: Yes  Patient Education: Yes  Patient Education Response: Verbalizes understanding  Frequency: 3-5x/week          Plan:   Speech Therapy Prognosis  Prognosis: Fair  Individuals consulted  Consulted and agree with results and recommendations: RN;Patient; Family member  Family member consulted: Daughter      Goals:  Short Term Goals  Goal 1: Pt will state biographical information with 90% accuracy. Goal 2: Pt will follow 2-3 step commands with 90% accuracy. Goal 3: Pt will answer moderate-complex y/n and [address] questions with 90% accuracy  Goal 4: .Pt will complete sentence completion tasks with 90% accuracy. Goal 5: Pt will generate 4-6 units in a given category with 90% accuracy.    Patient/family involved in developing goals and treatment plan: Yes      Subjective:   Social/Functional History  Lives With: Alone  Type of Home: House  Active : Yes  Mode of Transportation: Car  Vision  Vision: Within Functional Limits  Hearing  Hearing: Within functional limits           Objective:     Oral Motor   Labial: No impairment  Lingual: Decreased strength;Flaccid (Pt with decreased lip seal.)    Motor Speech  Apraxic Characteristics: None  Intelligibility: No impairment  Overall Impairment Severity: None    Auditory Comprehension  Comprehension: Exceptions  Two Step Commands: WFL  Multistep Commands: Mild (2/3)         Expression  Primary Mode of Expression: Verbal    Verbal Expression  Verbal Expression: Exceptions to functional limits  Repetition: Moderate (2/4)  Automatic Speech: WFL  Confrontation: Mild (3/4)  Divergent: Severe (+3)  Responsive: Mild (3/4)  Sentence Completion: Severe (1/4)  Interfering Components: Tangential speech       Cognition:      Orientation  Overall Orientation Status: Impaired  Orientation Level: Oriented to person;Disoriented to place; Disoriented to time;Disoriented to situation (Oriented to year, month, place, and president with choice of 2.)      Prognosis:  Speech Therapy Prognosis  Prognosis: Fair  Individuals consulted  Consulted and agree with results and recommendations: RN;Patient; Family member  Family member consulted: Daughter    Education:  Patient Education: Yes  Patient Education Response: Verbalizes understanding          Therapy Time:   Individual Concurrent Group Co-treatment   Time In  10:13         Time Out  10:33         Minutes  20                 Completed by:  Chritsen Hawthorne  Clinician    Cosigned By: Izabella Thomas S.CCC/SLP

## 2022-11-30 NOTE — ED NOTES
Pt to ED as transfer from [facility]. Pt had unwitnessed fall, found at bottom of stairs at [employer]. Found to have right occipital subarachnoid and left SDH. Unsure if pt is on anticoagulation therapy. Given 8 mg of Zofran and 5 mg Reglan PTA. Arrived in C-collar. GCS 15,  answers questions appropriately and follows commands. On 2 L nasal cannula.       Brittani Muller RN  11/29/22 1459

## 2022-11-30 NOTE — DISCHARGE INSTRUCTIONS
Discharge Instructions for Trauma         What to do after you leave the hospital:  General questions or concerns may be called to the trauma nurse line at [phone] and please leave a message. Trauma is a life-threatening condition. Your doctor will want to closely monitor you. Be sure to go to all of your appointments. Please continue to use your Incentive Spirometer as directed. You can practice 10 deep breaths/hour while awake. Using the [address] will promote the health of your lungs by taking slow, deep breaths in. It is also important in preventing pneumonia or a pneumothorax from developing. You sustained a head injury during your recent traumatic event. Please refrain from any activities that could put you at risk for further injury to your head as you heal over the next 3-4 weeks (such as ladders, contact sports, 4-kaye/ATV activities, etc.) You received a cognitive evaluation while hospitalized-follow all instructions given by the speech-language pathologist.   For additional support and resources for you and your family contact the [facility] at [phone]. This center offers additional therapy, support groups, education and other resources at no cost. The center is located at [address] [facility]. [facility], [address] [place] [address]. You can also visit [email or web address] for more information.

## 2022-11-30 NOTE — CONSULTS
Endovascular Neurosurgery Consult    Pt Name: Samuel Bermudez  MRN: [id number]  [place]: 1940  Date of evaluation: 11/30/2022  Primary Care Physician: No primary care provider on file. Patient evaluated at the request of  Dr. Irene Campbell  Reason for evaluation: SAH    SUBJECTIVE:   History of Chief Complaint:    Samuel Bermudez is a 78 y.o. female who presents after a fall down a flight of stairs. Patient reportedly had an unwitnessed fall down a flight of approximately 10 concrete steps and was found down following. She does not recall the event and is unsure if she had lost consciousness or for how long. Patient on initial arrival had a GCS of 14. Not intubated. Initial CT head showed large left posterior SDH and right SAH. She was initially evaluated at [facility] where CT and CTA were both done. CTA was read and did not reveal any aneurysm or AVM. Patient was transferred to [facility] for evaluation by trauma due to the brain bleeds. Endovascular was consulted for second opinion on CTA and evaluation for need for DSA. Patient was on aspirin for previous cardiac cath approximately 30 years ago. Not on any blood thinners currently. Patient is lethargic but easily arousable. Moves all 4 extremities spontaneously and is following commands. Pupils equal 3 mm and reactive to light. Is complaining of a mild headache but denies any neck or back pain. Allergies  is allergic to keflex [cephalexin], codeine, and penicillins. Medications  Prior to Admission medications    Medication Sig Start Date End Date Taking? Authorizing Provider   lisinopril (PRINIVIL;ZESTRIL) 10 MG tablet Take 10 mg by mouth daily   Yes Historical Provider, MD   hydroCHLOROthiazide (HYDRODIURIL) 25 MG tablet Take 25 mg by mouth daily   Yes Historical Provider, MD   ALPRAZolam (XANAX) 0.25 MG tablet Take 0.25 mg by mouth nightly as needed for Sleep (BID as needed).    Yes Historical Provider, MD   acetaminophen (TYLENOL) 500 MG tablet Take 500 mg by mouth every 6 hours as needed for Pain   Yes Historical Provider, MD   aspirin 81 MG chewable tablet Take 81 mg by mouth daily   Yes Historical Provider, MD   bimatoprost (LUMIGAN) 0.01 % SOLN ophthalmic drops Place 1 drop into both eyes nightly   Yes Historical Provider, MD   timolol (BETIMOL) 0.25 % ophthalmic solution Place 1-2 drops into both eyes 2 times daily   Yes Historical Provider, MD    Scheduled Meds:   desmopressin (DDAVP) IVPB  0.4 mcg/kg IntraVENous Once    sodium chloride flush  5-40 mL IntraVENous 2 times per day    polyethylene glycol  17 g Oral Daily    famotidine (PEPCID) injection  20 mg IntraVENous BID    acetaminophen  1,000 mg Oral 3 times per day     Continuous Infusions:   sodium chloride      sodium chloride 100 mL/hr at 11/30/22 0053     PRN Meds:.sodium chloride flush, sodium chloride, ondansetron **OR** ondansetron  Past Medical History   has a past medical history of Non-smoker. Past Surgical History   has no past surgical history on file. Social History   has no history on file for tobacco use.   has no history on file for alcohol use.   has no history on file for drug use. Family History  family history is not on file. Review of Systems:  CONSTITUTIONAL:  negative for fevers, chills, fatigue and malaise    EYES:  negative for double vision, blurred vision and photophobia     HEENT:  negative for tinnitus, epistaxis and sore throat    RESPIRATORY:  negative for cough, shortness of breath, wheezing    CARDIOVASCULAR:  negative for chest pain, palpitations, syncope, edema    GASTROINTESTINAL:  negative for nausea, vomiting    GENITOURINARY:  negative for incontinence    MUSCULOSKELETAL:  negative for neck or back pain    NEUROLOGICAL:  Negative for weakness and tingling  negative for headaches and dizziness    PSYCHIATRIC:  negative for anxiety      Review of systems otherwise negative.       OBJECTIVE:   Vitals: BP (!) 106/53   Pulse 79   Temp 97.6 °F (36.4 °C) (Oral)   Resp 14   Ht 4' 9\" (1.448 m)   Wt 145 lb 11.2 oz (66.1 kg)   SpO2 97%   BMI 31.53 kg/m²   General appearance: Lying in bed, in a c-collar, lethargic but easily arousable to voice  HEENT: Head: scalp contusion, pupils 3 mm equal and reactive, extraocular movement intact  Neck: Patient in a c-collar  Lungs: clear to auscultation bilaterally  Heart: regular rate and rhythm, S1, S2 normal, no murmur, click, rub or gallop  Abdomen: soft, non-tender; bowel sounds normal; no masses,  no organomegaly  Extremities: extremities normal, atraumatic, no cyanosis or edema  Neurologic: Mental status: Lethargic, easily arousable to voice  CN: Cranial nerves II through XII grossly intact  MOTOR: Moves all 4 extremities spontaneously, follows commands.   Pain in left hip but with tenderness motor exam is 5/5 grossly throughout  SENSORY: Equal sensation in all 4 extremities to light touch and pinprick  COORDINATION: Intact finger-to-nose    LABS:     Recent Labs     11/29/22 2117 11/30/22 0117   WBC 16.4*  --    HGB 13.2  --    HCT 38.6  --      --    *  --    K 3.9  --    CL 90*  --    CO2 23  --    BUN 13  --    CREATININE 0.74  --    INR 1.0  --    NITRU  --  NEGATIVE   COLORU  --  Yellow     Recent Labs     11/29/22 2117   LABALBU 4.3     CBC:   Lab Results   Component Value Date/Time    WBC 16.4 11/29/2022 09:17 PM    RBC 4.22 11/29/2022 09:17 PM    HGB 13.2 11/29/2022 09:17 PM    HCT 38.6 11/29/2022 09:17 PM    MCV 91.5 11/29/2022 09:17 PM    MCH 31.3 11/29/2022 09:17 PM    MCHC 34.2 11/29/2022 09:17 PM    RDW 12.7 11/29/2022 09:17 PM     11/29/2022 09:17 PM    MPV 11.4 11/29/2022 09:17 PM     CMP:    Lab Results   Component Value Date/Time     11/30/2022 05:01 AM    K 3.7 11/30/2022 05:01 AM    CL 94 11/30/2022 05:01 AM    CO2 20 11/30/2022 05:01 AM    BUN 10 11/30/2022 05:01 AM    CREATININE 0.70 11/30/2022 05:01 AM    LABGLOM >60 11/30/2022 05:01 AM    GLUCOSE 126 11/30/2022 05:01 AM    LABALBU 4.3 11/29/2022 09:17 PM    CALCIUM 8.8 11/30/2022 05:01 AM     PT/INR:    Lab Results   Component Value Date/Time    PROTIME 10.3 11/29/2022 09:17 PM    INR 1.0 11/29/2022 09:17 PM     PTT:    Lab Results   Component Value Date/Time    APTT 25.0 11/29/2022 09:17 PM   [APTT}    RADIOLOGY:   Images were personally reviewed including:  CT brain without contrast: Left SDH with right extensive SAH. CTA imaging: No obvious aneurysm or AV malformation      IMPRESSIONS:     Likely traumatic SDH and SAH given mechanism of injury and grossly normal CTA  History of previous cardiac catheterization, on aspirin    PLANS:   Given mechanism of injury, more than likely traumatic SDH and SAH  Will evaluate need for DSA  SBP <160 for now  Stability CTH  Thank you for the interesting evaluation. Further recommendations to follow.     Jessica Joyce MD,   Pager [phone]  Stroke, [facility]  [address] [address] [place]  Electronically signed 11/30/2022 at 2:08 AM

## 2022-11-30 NOTE — ED PROVIDER NOTES
[facility] ED  Emergency Department Encounter  Emergency Medicine Resident     Pt Name: Vanda Felty  MRN: [id number]  [place] 1940  Date of evaluation: 11/29/22  PCP:  No primary care provider on file. CHIEF COMPLAINT       Chief Complaint   Patient presents with    Fall     HISTORY [facility]  (Location/Symptom, Timing/Onset, Context/Setting, Quality, Duration, Modifying Factors,Severity.)      Vanda Felty is a 78 y.o. female who presents as a transfer from [facility] due to subdural hematoma noted on head CT after she was found stairs at home. Patient is intermittently oriented to name. She does not remember the fall and is unable to state what happened. She is complaining of headache at time of arrival.  No other outward signs of trauma noted. She is unable to clearly state that she is on any anticoagulation. Patient repeats over that she does take Tylenol but does not state that she is on AC/AP. PAST MEDICAL / SURGICAL / SOCIAL / FAMILY HISTORY      has a past medical history of Non-smoker. has no past surgical history on file.     Social History     Socioeconomic History    Marital status: Not on file     Spouse name: Not on file    Number of children: Not on file    Years of education: Not on file    Highest education level: Not on file   Occupational History    Not on file   Tobacco Use    Smoking status: Not on file    Smokeless tobacco: Not on file   Substance and Sexual Activity    Alcohol use: Not on file    Drug use: Not on file    Sexual activity: Not on file   Other Topics Concern    Not on file   Social History Narrative    Not on file     Social Determinants of Health     Financial Resource Strain: Not on file   Food Insecurity: Not on file   Transportation Needs: Not on file   Physical Activity: Not on file   Stress: Not on file   Social Connections: Not on file   Intimate Partner Violence: Not on file   Housing Stability: Not on file       No family history on file. Allergies:  Keflex [cephalexin], Codeine, and Penicillins    Home Medications:  Prior to Admission medications    Medication Sig Start Date End Date Taking? Authorizing Provider   lisinopril (PRINIVIL;ZESTRIL) 10 MG tablet Take 10 mg by mouth daily   Yes Historical Provider, MD   hydroCHLOROthiazide (HYDRODIURIL) 25 MG tablet Take 25 mg by mouth daily   Yes Historical Provider, MD   ALPRAZolam (XANAX) 0.25 MG tablet Take 0.25 mg by mouth nightly as needed for Sleep (BID as needed). Yes Historical Provider, MD   acetaminophen (TYLENOL) 500 MG tablet Take 500 mg by mouth every 6 hours as needed for Pain   Yes Historical Provider, MD   aspirin 81 MG chewable tablet Take 81 mg by mouth daily   Yes Historical Provider, MD   bimatoprost (LUMIGAN) 0.01 % SOLN ophthalmic drops Place 1 drop into both eyes nightly   Yes Historical Provider, MD   timolol (BETIMOL) 0.25 % ophthalmic solution Place 1-2 drops into both eyes 2 times daily   Yes Historical Provider, MD       REVIEW OF SYSTEMS    (2-9 systems for level 4, 10 or more for level 5)      Review of Systems   Unable to perform ROS: Mental status change   Cardiovascular:  Negative for chest pain. Gastrointestinal:  Negative for abdominal pain. Skin:  Negative for wound. Neurological:  Positive for headaches. PHYSICAL EXAM   (up to 7 for level 4, 8 or more for level 5)     INITIAL VITALS:    height is 4' 9\" (1.448 m) and weight is 145 lb 11.2 oz (66.1 kg). Her oral temperature is 97.5 °F (36.4 °C). Her blood pressure is 132/57 (abnormal) and her pulse is 82. Her respiration is 13 and oxygen saturation is 98%. Physical Exam  Constitutional:       General: She is not in acute distress. HENT:      Head: Atraumatic. Mouth/Throat:      Mouth: Mucous membranes are moist.   Eyes:      Extraocular Movements: Extraocular movements intact. Pupils: Pupils are equal, round, and reactive to light.       Comments: Pupils 3mm and equally reactive   Cardiovascular:      Rate and Rhythm: Normal rate and regular rhythm. Pulses: Normal pulses. Heart sounds: Normal heart sounds. No murmur heard. Pulmonary:      Effort: Pulmonary effort is normal. No respiratory distress. Breath sounds: Normal breath sounds. No wheezing. Abdominal:      General: There is no distension. Palpations: Abdomen is soft. Tenderness: There is no abdominal tenderness. There is no guarding or rebound. Musculoskeletal:         General: Normal range of motion. Cervical back: Normal range of motion. Right lower leg: No edema. Left lower leg: No edema. Neurological:      General: No focal deficit present. Mental Status: She is oriented to person, place, and time.      DIFFERENTIAL  DIAGNOSIS     PLAN (LABS / IMAGING / EKG):  Orders Placed This Encounter   Procedures    MRSA DNA Probe, Nasal    CT HEAD WO CONTRAST    CT CERVICAL SPINE WO CONTRAST    CT THORACIC SPINE TRAUMA RECONSTRUCTION    CT LUMBAR SPINE TRAUMA RECONSTRUCTION    CT CHEST ABDOMEN PELVIS W CONTRAST Additional Contrast? None    CT HEAD WO CONTRAST    CTA HEAD NECK W CONTRAST    Albumin    Vitamin B12    TROP/MYOGLOBIN    Trauma Panel    T4, Free    Hemoglobin A1C    TSH    Vitamin D 25 Hydroxy    Urinalysis    Basic Metabolic Panel w/ Reflex to MG    Magnesium    Phosphorus    Calcium, Ionized    Basic Metabolic Panel    Diet NPO    Telemetry monitoring - 72 hour duration    Straight cath    Vital signs per unit routine    Notify patient's primary care physician of admission    Place intermittent pneumatic compression device    Notify physician    Strict Bedrest    Bedrest - Elevate Head of Bed    Intake and output    Neuro checks    Elevate Head of Bed     Discontinue indwelling urinary catheter when documented indications no longer apply per hospital policy    Insert indwelling urinary catheter    Discontinue indwelling urinary catheter when documented indications no longer apply per hospital policy    Full Code    Inpatient consult to Neurosurgery    Inpatient Consult to Endovascular Neurosurgery    OT eval and treat    PT evaluation and treat    Initiate Oxygen Therapy Protocol    Incentive spirometry    Speech language pathology evaluation    POCT glucose    Type and Screen    ADMIT TO INPATIENT    ADMIT TO INPATIENT     MEDICATIONS ORDERED:  Orders Placed This Encounter   Medications    iopamidol (ISOVUE-370) 76 % injection 130 mL    levETIRAcetam in NaCl (KEPPRA) 1000 MG/100ML solution     TERENCE HOUSER: cabinet override    levETIRAcetam (KEPPRA) 1000 mg/100 mL IVPB    ondansetron (ZOFRAN) injection 4 mg    ondansetron (ZOFRAN) 4 MG/2ML injection     Justyna Bob: cabinet override    desmopressin (DDAVP) 25.4 mcg in sodium chloride 0.9 % 50 mL IVPB    sodium chloride flush 0.9 % injection 5-40 mL    sodium chloride flush 0.9 % injection 5-40 mL    0.9 % sodium chloride infusion    OR Linked Order Group     ondansetron (ZOFRAN-ODT) disintegrating tablet 4 mg     ondansetron (ZOFRAN) injection 4 mg    polyethylene glycol (GLYCOLAX) packet 17 g    0.9 % sodium chloride infusion    famotidine (PEPCID) 20 mg in sodium chloride (PF) 0.9 % 10 mL injection    acetaminophen (TYLENOL) tablet 1,000 mg    iopamidol (ISOVUE-370) 76 % injection 90 mL    levETIRAcetam (KEPPRA) 500 mg/100 mL IVPB     DDX: Subdural hematoma, spinal injury    Initial MDM/Plan: 78 y.o. female who presents with subdural hematoma after fall. Unwitnessed. She does not remember that encounter. Patient seen as trauma priority and admitted to trauma service. Known subdural hematoma.       DIAGNOSTIC RESULTS / EMERGENCY DEPARTMENT COURSE / MDM     LABS:  Labs Reviewed   TRAUMA PANEL - Abnormal; Notable for the following components:       Result Value    WBC 16.4 (*)     Est, Glom Filt Rate 56 (*)     Glucose 116 (*)     Sodium 125 (*)     Chloride 90 (*)     O2 Sat, Albin 57.4 (*)     All other components within normal limits   VITAMIN D 25 HYDROXY - Abnormal; Notable for the following components:    Vit D, 25-Hydroxy 16.5 (*)     All other components within normal limits   URINALYSIS - Abnormal; Notable for the following components:    Specific Gravity, UA 1.052 (*)     All other components within normal limits   BASIC METABOLIC PANEL W/ REFLEX TO MG FOR LOW K - Abnormal; Notable for the following components:    Glucose 126 (*)     Sodium 129 (*)     Chloride 94 (*)     All other components within normal limits   CALCIUM, IONIZED - Abnormal; Notable for the following components:    Calcium, Ionized 1.11 (*)     All other components within normal limits   BASIC METABOLIC PANEL - Abnormal; Notable for the following components:    Glucose 135 (*)     Sodium 129 (*)     Chloride 94 (*)     All other components within normal limits   MRSA DNA PROBE, NASAL   ALBUMIN   VITAMIN B12   TROP/MYOGLOBIN   T4, FREE   TSH   PHOSPHORUS   HEMOGLOBIN A1C   MAGNESIUM   POCT GLUCOSE   POCT GLUCOSE   POCT GLUCOSE   TYPE AND SCREEN       RADIOLOGY:  CT HEAD WO CONTRAST    Result Date: 11/30/2022  EXAMINATION: CT OF THE HEAD WITHOUT CONTRAST  11/30/2022 4:32 am TECHNIQUE: CT of the head was performed without the administration of intravenous contrast. Automated exposure control, iterative reconstruction, and/or weight based adjustment of the mA/kV was utilized to reduce the radiation dose to as low as reasonably achievable. COMPARISON: 11/29/2022. HISTORY: ORDERING SYSTEM PROVIDED HISTORY: sah, sdh TECHNOLOGIST PROVIDED HISTORY: sah, sdh FINDINGS: BRAIN/VENTRICLES: Again noted and unchanged is copious right hemispheric subarachnoid hemorrhage with minimal anterior to anterolateral right subdural hematoma. There also appears to be subdural hematoma extending along the right tentorial leaflet. This is minimal and although not mentioned on the report for the earlier exam, it is unchanged.   Also unchanged is the more prominent left convexity subdural hematoma with maximum thickness of 13 mm. No significant midline shift. No parenchymal hemorrhage identified. Mild diffuse cerebral atrophy and chronic white matter ischemic change. ORBITS: The visualized portion of the orbits demonstrate no acute abnormality. SINUSES: The visualized paranasal sinuses and mastoid air cells demonstrate no acute abnormality. SOFT TISSUES/SKULL:  Osseous structures appear intact. Left posterolateral scalp hematoma is noted. No significant interval change since the exam approximately 7 hours earlier with fairly extensive extra-axial hemorrhage as described above. CT HEAD WO CONTRAST    Result Date: 11/29/2022  EXAMINATION: CT OF THE HEAD WITHOUT CONTRAST  11/29/2022 9:12 pm TECHNIQUE: CT of the head was performed without the administration of intravenous contrast. Automated exposure control, iterative reconstruction, and/or weight based adjustment of the mA/kV was utilized to reduce the radiation dose to as low as reasonably achievable. COMPARISON: None. HISTORY: ORDERING SYSTEM PROVIDED HISTORY: fall TECHNOLOGIST PROVIDED HISTORY: fall Reason for Exam: trauma fall Relevant Medical/Surgical History: pt received contrast 2 hours ago, trauma attending override due to neurological changes FINDINGS: BRAIN/VENTRICLES: Copious subarachnoid hemorrhage is present at the right anterior and lateral aspects of the cerebral hemisphere most prominently involving the sylvian fissure region. There is also superimposed anterior to anterolateral right convexity minor subdural hematoma. More prominent more diffuse left convexity subdural hematoma is present with maximum thickness of approximately 13 mm as measured on coronal image 55. There is resulting 2-3 mm rightward midline shift. The ventricular system is unremarkable. There is mild diffuse cerebral atrophy and chronic white matter ischemic change.  ORBITS: The visualized portion of the orbits demonstrate no acute abnormality. SINUSES: The visualized paranasal sinuses and mastoid air cells demonstrate no acute abnormality. SOFT TISSUES/SKULL:  Osseous structures appear intact. This includes the lateral aspect of the left occipital bone. There is a small to moderate localized scalp hematoma at the left posterolateral aspect. Right subarachnoid and bilateral subdural hematomas as described with minimal rightward midline shift. Mild diffuse cerebral atrophy and chronic white matter ischemic change. Left posterolateral scalp hematoma. Critical results were called by Dr. Albert Govea to Dr. Jewels Dietrich On 11/29/2022 at 22:37. CT CERVICAL SPINE WO CONTRAST    Result Date: 11/29/2022  EXAMINATION: CT OF THE CERVICAL SPINE WITHOUT CONTRAST 11/29/2022 9:12 pm TECHNIQUE: CT of the cervical spine was performed without the administration of intravenous contrast. Multiplanar reformatted images are provided for review. Automated exposure control, iterative reconstruction, and/or weight based adjustment of the mA/kV was utilized to reduce the radiation dose to as low as reasonably achievable. COMPARISON: None. HISTORY: ORDERING SYSTEM PROVIDED HISTORY: fall TECHNOLOGIST PROVIDED HISTORY: fall Reason for Exam: trauma fall FINDINGS: Examination is mildly degraded by motion. BONES/ALIGNMENT: There is reversal the normal cervical lordosis. No significant listhesis. Vertebral body heights are maintained. No displaced fracture. DEGENERATIVE CHANGES: Multilevel degenerative changes of the cervical spine. SOFT TISSUES: There is no prevertebral soft tissue swelling. 1.2 cm right parotid nodule. 1. No acute fracture of the cervical spine. 2. Multilevel degenerative change. 3. Straightening of the normal cervical lordosis. 4. 1.2 cm right parotid lesion is indeterminate, possibly an intraparotid lymph node. Nonemergent follow-up ultrasound may be useful for further evaluation.      CTA HEAD NECK W CONTRAST    Result Date: 11/30/2022  EXAMINATION: CTA OF THE HEAD AND NECK WITH CONTRAST 11/30/2022 4:32 am: TECHNIQUE: CTA of the head and neck was performed with the administration of intravenous contrast. Multiplanar reformatted images are provided for review. MIP images are provided for review. Stenosis of the internal carotid arteries measured using NASCET criteria. Automated exposure control, iterative reconstruction, and/or weight based adjustment of the mA/kV was utilized to reduce the radiation dose to as low as reasonably achievable. COMPARISON: None. HISTORY: ORDERING SYSTEM PROVIDED HISTORY: fall sdh, sah TECHNOLOGIST PROVIDED HISTORY: fall sdh, sah FINDINGS: CTA NECK: AORTIC ARCH/ARCH VESSELS: No dissection or arterial injury. No significant stenosis of the brachiocephalic or subclavian arteries. CAROTID ARTERIES: No dissection, arterial injury, or hemodynamically significant stenosis by NASCET criteria. VERTEBRAL ARTERIES: No dissection, arterial injury, or significant stenosis. SOFT TISSUES: The lung apices are clear. No cervical or superior mediastinal lymphadenopathy. The larynx and pharynx are unremarkable. No acute abnormality of the salivary and thyroid glands. BONES: No acute osseous abnormality. CTA HEAD: ANTERIOR CIRCULATION: No significant stenosis of the intracranial internal carotid, anterior cerebral, or middle cerebral arteries. No aneurysm. The right A1 segment is diminutive. The left is prominent and there is a robust anterior communicating artery. POSTERIOR CIRCULATION: No significant stenosis of the vertebral, basilar, or posterior cerebral arteries. No aneurysm. Balanced posterior communicating artery and P1 segment are present on the left. No posterior communicating artery on the right. OTHER: No dural venous sinus thrombosis on this non-dedicated study. Left posterolateral scalp hematoma. BRAIN: Please refer to the report for the dedicated noncontrast head CT. Intracranial hemorrhage is present. Unremarkable CTA of the head and neck. Intracranial hemorrhage present. Please refer to the report for the dedicated noncontrast head CT. Left posterolateral scalp hematoma. CT CHEST ABDOMEN PELVIS W CONTRAST Additional Contrast? None    Result Date: 11/29/2022  EXAMINATION: CT OF THE CHEST, ABDOMEN, AND PELVIS WITH CONTRAST; CT OF THE THORACIC SPINE WITHOUT CONTRAST; CT OF THE LUMBAR SPINE WITHOUT CONTRAST 11/29/2022 9:13 pm TECHNIQUE: CT of the chest, abdomen and pelvis was performed with the administration of intravenous contrast. Multiplanar reformatted images are provided for review. Automated exposure control, iterative reconstruction, and/or weight based adjustment of the mA/kV was utilized to reduce the radiation dose to as low as reasonably achievable.; CT of the thoracic spine was performed without the administration of intravenous contrast. Multiplanar reformatted images are provided for review. Automated exposure control, iterative reconstruction, and/or weight based adjustment of the mA/kV was utilized to reduce the radiation dose to as low as reasonably achievable.; CT of the lumbar spine was performed without the administration of intravenous contrast. Multiplanar reformatted images are provided for review. Adjustment of mA and/or kV according to patient size was utilized. Automated exposure control, iterative reconstruction, and/or weight based adjustment of the mA/kV was utilized to reduce the radiation dose to as low as reasonably achievable. COMPARISON: None HISTORY: ORDERING SYSTEM PROVIDED HISTORY: fall TECHNOLOGIST PROVIDED HISTORY: fall Reason for Exam: trauma fall Relevant Medical/Surgical History: exam repeated due to motion. pt is altered and won't hold still; ORDERING SYSTEM PROVIDED HISTORY: fall TECHNOLOGIST PROVIDED HISTORY: fall Reason for Exam: trauma fall FINDINGS: Examination is degraded by motion.  Chest: Mediastinum: Heart is upper limits of normal in size without pericardial effusion. Thoracic aorta and main pulmonary artery are normal in caliber. No mediastinal hematoma. Lungs/pleura: There is no pleural effusion. There is no pneumothorax. There is subsegmental atelectasis in the lung bases. Soft Tissues/Bones: No displaced fracture within the limitations of motion. Abdomen/Pelvis: Organs: The liver is diffusely hypoattenuating. Prior cholecystectomy. No acute abnormality within the spleen, pancreas, or adrenal glands. No hydronephrosis. Bilateral renal cysts measure up to 2.6 cm on the right. No further imaging follow-up is required. Subcentimeter hypoattenuating hepatic lesions are too small to accurately characterize, likely cysts or hemangiomas. GI/Bowel: There is a small hiatal hernia. The stomach is partially distended. The small bowel is nondilated. The colon is nondilated. There are scattered, noninflamed diverticula. Pelvis: Bladder is partially distended with layering contrast. Peritoneum/Retroperitoneum: No ascites or pneumoperitoneum. Abdominal aorta is normal in caliber with scattered atherosclerosis. Bones/Soft Tissues: No acute osseous abnormality. There is edema/contusion in the left upper thigh soft tissues. THORACIC/LUMBAR SPINES Mild dextrocurvature in the lower thoracic spine. No significant listhesis. Mild kyphosis. Vertebral body heights are maintained. No displaced fracture within the limitations of motion. There is levocurvature of the lumbar spine. There are 5 lumbar vertebral bodies. There is anterolisthesis at L3-4, L4-5, and L5-S1. Vertebral body heights are maintained. No displaced fracture. There are multilevel degenerative changes of the lumbar spine. 1. No acute intrathoracic abnormality. 2. No acute abdominal abnormality. 3. No acute abnormality in the thoracic or lumbar spines. 4. Edema/contusion in the left upper thigh soft tissues. 5. Hepatic steatosis. 6. Diverticulosis without diverticulitis.      CT LUMBAR SPINE TRAUMA RECONSTRUCTION    Result Date: 11/29/2022  EXAMINATION: CT OF THE CHEST, ABDOMEN, AND PELVIS WITH CONTRAST; CT OF THE THORACIC SPINE WITHOUT CONTRAST; CT OF THE LUMBAR SPINE WITHOUT CONTRAST 11/29/2022 9:13 pm TECHNIQUE: CT of the chest, abdomen and pelvis was performed with the administration of intravenous contrast. Multiplanar reformatted images are provided for review. Automated exposure control, iterative reconstruction, and/or weight based adjustment of the mA/kV was utilized to reduce the radiation dose to as low as reasonably achievable.; CT of the thoracic spine was performed without the administration of intravenous contrast. Multiplanar reformatted images are provided for review. Automated exposure control, iterative reconstruction, and/or weight based adjustment of the mA/kV was utilized to reduce the radiation dose to as low as reasonably achievable.; CT of the lumbar spine was performed without the administration of intravenous contrast. Multiplanar reformatted images are provided for review. Adjustment of mA and/or kV according to patient size was utilized. Automated exposure control, iterative reconstruction, and/or weight based adjustment of the mA/kV was utilized to reduce the radiation dose to as low as reasonably achievable. COMPARISON: None HISTORY: ORDERING SYSTEM PROVIDED HISTORY: fall TECHNOLOGIST PROVIDED HISTORY: fall Reason for Exam: trauma fall Relevant Medical/Surgical History: exam repeated due to motion. pt is altered and won't hold still; ORDERING SYSTEM PROVIDED HISTORY: fall TECHNOLOGIST PROVIDED HISTORY: fall Reason for Exam: trauma fall FINDINGS: Examination is degraded by motion. Chest: Mediastinum: Heart is upper limits of normal in size without pericardial effusion. Thoracic aorta and main pulmonary artery are normal in caliber. No mediastinal hematoma. Lungs/pleura: There is no pleural effusion. There is no pneumothorax.   There is subsegmental atelectasis in the lung bases. Soft Tissues/Bones: No displaced fracture within the limitations of motion. Abdomen/Pelvis: Organs: The liver is diffusely hypoattenuating. Prior cholecystectomy. No acute abnormality within the spleen, pancreas, or adrenal glands. No hydronephrosis. Bilateral renal cysts measure up to 2.6 cm on the right. No further imaging follow-up is required. Subcentimeter hypoattenuating hepatic lesions are too small to accurately characterize, likely cysts or hemangiomas. GI/Bowel: There is a small hiatal hernia. The stomach is partially distended. The small bowel is nondilated. The colon is nondilated. There are scattered, noninflamed diverticula. Pelvis: Bladder is partially distended with layering contrast. Peritoneum/Retroperitoneum: No ascites or pneumoperitoneum. Abdominal aorta is normal in caliber with scattered atherosclerosis. Bones/Soft Tissues: No acute osseous abnormality. There is edema/contusion in the left upper thigh soft tissues. THORACIC/LUMBAR SPINES Mild dextrocurvature in the lower thoracic spine. No significant listhesis. Mild kyphosis. Vertebral body heights are maintained. No displaced fracture within the limitations of motion. There is levocurvature of the lumbar spine. There are 5 lumbar vertebral bodies. There is anterolisthesis at L3-4, L4-5, and L5-S1. Vertebral body heights are maintained. No displaced fracture. There are multilevel degenerative changes of the lumbar spine. 1. No acute intrathoracic abnormality. 2. No acute abdominal abnormality. 3. No acute abnormality in the thoracic or lumbar spines. 4. Edema/contusion in the left upper thigh soft tissues. 5. Hepatic steatosis. 6. Diverticulosis without diverticulitis.      CT THORACIC SPINE TRAUMA RECONSTRUCTION    Result Date: 11/29/2022  EXAMINATION: CT OF THE CHEST, ABDOMEN, AND PELVIS WITH CONTRAST; CT OF THE THORACIC SPINE WITHOUT CONTRAST; CT OF THE LUMBAR SPINE WITHOUT CONTRAST 11/29/2022 9:13 pm TECHNIQUE: CT of the chest, abdomen and pelvis was performed with the administration of intravenous contrast. Multiplanar reformatted images are provided for review. Automated exposure control, iterative reconstruction, and/or weight based adjustment of the mA/kV was utilized to reduce the radiation dose to as low as reasonably achievable.; CT of the thoracic spine was performed without the administration of intravenous contrast. Multiplanar reformatted images are provided for review. Automated exposure control, iterative reconstruction, and/or weight based adjustment of the mA/kV was utilized to reduce the radiation dose to as low as reasonably achievable.; CT of the lumbar spine was performed without the administration of intravenous contrast. Multiplanar reformatted images are provided for review. Adjustment of mA and/or kV according to patient size was utilized. Automated exposure control, iterative reconstruction, and/or weight based adjustment of the mA/kV was utilized to reduce the radiation dose to as low as reasonably achievable. COMPARISON: None HISTORY: ORDERING SYSTEM PROVIDED HISTORY: fall TECHNOLOGIST PROVIDED HISTORY: fall Reason for Exam: trauma fall Relevant Medical/Surgical History: exam repeated due to motion. pt is altered and won't hold still; ORDERING SYSTEM PROVIDED HISTORY: fall TECHNOLOGIST PROVIDED HISTORY: fall Reason for Exam: trauma fall FINDINGS: Examination is degraded by motion. Chest: Mediastinum: Heart is upper limits of normal in size without pericardial effusion. Thoracic aorta and main pulmonary artery are normal in caliber. No mediastinal hematoma. Lungs/pleura: There is no pleural effusion. There is no pneumothorax. There is subsegmental atelectasis in the lung bases. Soft Tissues/Bones: No displaced fracture within the limitations of motion. Abdomen/Pelvis: Organs: The liver is diffusely hypoattenuating. Prior cholecystectomy.   No acute abnormality within the spleen, pancreas, or adrenal glands. No hydronephrosis. Bilateral renal cysts measure up to 2.6 cm on the right. No further imaging follow-up is required. Subcentimeter hypoattenuating hepatic lesions are too small to accurately characterize, likely cysts or hemangiomas. GI/Bowel: There is a small hiatal hernia. The stomach is partially distended. The small bowel is nondilated. The colon is nondilated. There are scattered, noninflamed diverticula. Pelvis: Bladder is partially distended with layering contrast. Peritoneum/Retroperitoneum: No ascites or pneumoperitoneum. Abdominal aorta is normal in caliber with scattered atherosclerosis. Bones/Soft Tissues: No acute osseous abnormality. There is edema/contusion in the left upper thigh soft tissues. THORACIC/LUMBAR SPINES Mild dextrocurvature in the lower thoracic spine. No significant listhesis. Mild kyphosis. Vertebral body heights are maintained. No displaced fracture within the limitations of motion. There is levocurvature of the lumbar spine. There are 5 lumbar vertebral bodies. There is anterolisthesis at L3-4, L4-5, and L5-S1. Vertebral body heights are maintained. No displaced fracture. There are multilevel degenerative changes of the lumbar spine. 1. No acute intrathoracic abnormality. 2. No acute abdominal abnormality. 3. No acute abnormality in the thoracic or lumbar spines. 4. Edema/contusion in the left upper thigh soft tissues. 5. Hepatic steatosis. 6. Diverticulosis without diverticulitis. EKG  Not indicated    All EKG's are interpreted by the Emergency Department Physician who either signs or Co-signs this chart in the absence of a cardiologist.          [place] Coma Scale  Eye Opening:  To speech  Best Verbal Response: Confused  Best Motor Response: Obeys commands  [place] Coma Scale Score: 13    EMERGENCY DEPARTMENT COURSE:     Patient is an 57-year-old female with unknown medical history and unable to state if on anticoagulation seen as trauma priority for known subdural hematoma which was found at outlying facility after fall down stairs at [facility]. Patient unable to recall the event. No outward signs of trauma otherwise. Patient had completion scans performed without any acute traumatic injuries identified. Patient admitted to the trauma service for further management of SDH with neurosurgery consult. PROCEDURES:  None    CONSULTS:  IP CONSULT TO NEUROSURGERY  IP CONSULT TO ENDOVASCULAR NEUROSURGERY    CRITICAL CARE:  There was significant risk of life threatening deterioration of patient's condition requiring my direct management. Critical care time 0 minutes, excluding any documented procedures. FINAL IMPRESSION      1. Subdural hematoma        DISPOSITION / PLAN     DISPOSITION Admitted 11/29/2022 09:36:36 PM    PATIENTREFERRED TO:  No follow-up provider specified.     DISCHARGE MEDICATIONS:  Current Discharge Medication List          King Gold MD  Emergency Medicine Resident    (Please note that portions of this note were completed with a voice recognition program.  Efforts were made to edit the dictations but occasionally words are mis-transcribed.)        Bea Wilkes MD  Resident  11/30/22 7086

## 2022-11-30 NOTE — ED NOTES
78 f fall at [employer]   CT head and neck CTA head and neck pending. Subdural multifocal IPH    No blood thinners. Does take aspirin. Vitals ok , moderate hypertension 160s.         Carline Osorio RN  11/29/22 3436

## 2022-11-30 NOTE — PROGRESS NOTES
Trauma Tertiary Survey    Admit Date: 11/29/2022  Hospital day 1    [facility]       Past Medical History:   Diagnosis Date    Non-smoker        Scheduled Meds:   latanoprost  1 drop Both Eyes Nightly    timolol  1 drop Both Eyes BID    [Held by provider] lisinopril  10 mg Oral Daily    [Held by provider] hydroCHLOROthiazide  25 mg Oral Daily    senna  1 tablet Oral Nightly    levETIRAcetam  500 mg IntraVENous Q12H    propranolol  10 mg Oral TID    polyethylene glycol  17 g Oral Daily    acetaminophen  1,000 mg Oral 3 times per day     Continuous Infusions:   sodium chloride 50 mL/hr at 11/30/22 0800     PRN Meds:sodium chloride flush, ondansetron **OR** ondansetron    Subjective:     Improved GCS, but felt nauseous during exam. Repeated head CT stable. Objective:   Patient Vitals for the past 8 hrs:   BP Temp Temp src Pulse Resp SpO2   11/30/22 1800 (!) 140/54 -- -- 73 18 100 %   11/30/22 1700 (!) 126/51 -- -- 66 14 99 %   11/30/22 1600 (!) 108/43 -- -- 64 21 96 %   11/30/22 1544 -- -- -- 58 -- --   11/30/22 1504 114/60 -- -- 70 16 95 %   11/30/22 1500 -- 98.2 °F (36.8 °C) Axillary -- -- --   11/30/22 1400 (!) 129/59 -- -- 71 16 99 %   11/30/22 1316 (!) 112/48 -- -- 58 16 97 %   11/30/22 1200 (!) 112/43 97.7 °F (36.5 °C) Axillary 71 16 96 %   11/30/22 1100 (!) 128/55 -- -- 81 14 100 %   11/30/22 1054 (!) 120/53 -- -- 86 -- --       I/O last 3 completed shifts: In: 0   Out: 2075 [Urine:2075]  I/O this shift:  In: -   Out: [address] [Urine:975]    Radiology:  CT HEAD WO CONTRAST    Result Date: 11/30/2022  Bilateral subdural hemorrhage with the left being larger than the right. Overall these are similar in size compared to prior examination. Subarachnoid hemorrhage within the cerebral hemispheres bilaterally that is more pronounced on the right compared to the left. Overall this is similar compared to prior examination.      CT HEAD WO CONTRAST    Result Date: 11/30/2022  No significant interval change since the exam approximately 7 hours earlier with fairly extensive extra-axial hemorrhage as described above. CT HEAD WO CONTRAST    Result Date: 11/29/2022  Right subarachnoid and bilateral subdural hematomas as described with minimal rightward midline shift. Mild diffuse cerebral atrophy and chronic white matter ischemic change. Left posterolateral scalp hematoma. Critical results were called by Dr. Lissette Amaral to Dr. Emani Felix On 11/29/2022 at 22:37. CT CERVICAL SPINE WO CONTRAST    Result Date: 11/29/2022  1. No acute fracture of the cervical spine. 2. Multilevel degenerative change. 3. Straightening of the normal cervical lordosis. 4. 1.2 cm right parotid lesion is indeterminate, possibly an intraparotid lymph node. Nonemergent follow-up ultrasound may be useful for further evaluation. CTA HEAD NECK W CONTRAST    Result Date: 11/30/2022  Unremarkable CTA of the head and neck. Intracranial hemorrhage present. Please refer to the report for the dedicated noncontrast head CT. Left posterolateral scalp hematoma. CT CHEST ABDOMEN PELVIS W CONTRAST Additional Contrast? None    Result Date: 11/29/2022  1. No acute intrathoracic abnormality. 2. No acute abdominal abnormality. 3. No acute abnormality in the thoracic or lumbar spines. 4. Edema/contusion in the left upper thigh soft tissues. 5. Hepatic steatosis. 6. Diverticulosis without diverticulitis. CT LUMBAR SPINE TRAUMA RECONSTRUCTION    Result Date: 11/29/2022  1. No acute intrathoracic abnormality. 2. No acute abdominal abnormality. 3. No acute abnormality in the thoracic or lumbar spines. 4. Edema/contusion in the left upper thigh soft tissues. 5. Hepatic steatosis. 6. Diverticulosis without diverticulitis. CT THORACIC SPINE TRAUMA RECONSTRUCTION    Result Date: 11/29/2022  1. No acute intrathoracic abnormality. 2. No acute abdominal abnormality. 3. No acute abnormality in the thoracic or lumbar spines.  4. Edema/contusion in the left upper thigh soft tissues. 5. Hepatic steatosis. 6. Diverticulosis without diverticulitis.         PHYSICAL EXAM:   GCS:  4 - Opens eyes on own   6 - Follows simple motor commands  5 - Alert and oriented    Pupil size:  Left 2 mm Right 2 mm  Pupil reaction: Yes  Wiggles fingers: Left Yes Right Yes  Hand grasp:   Left normal   Right normal  Wiggles toes: Left Yes    Right Yes  Plantar flexion: Left normal  Right normal    GENERAL: more alert and awake this morning  NEURO: GCS 15, follow commend and alert  HEENT: normocephalic, c collar is cleared  : normal, has bridges  LUNGS: clear to auscultation bilaterally- no wheezes, rales or rhonchi, normal air movement, no respiratory distress and clear to ausculation, without wheezes, rales or rhonci  HEART: normal rate and regular rhythm  ABDOMEN: soft, non-tender, non-distended, bowel sounds present in all 4 quadrants, and no guarding or peritoneal signs present  EXTREMITY: no cyanosis, clubbing or edema       Spine:     Spine Tenderness ROM   Cervical 0 /10 Normal   Thoracic 4 /10 Normal   Lumbar 0 /10 Normal     Musculoskeletal    Joint Tenderness Swelling ROM   Right shoulder absent absent normal   Left shoulder absent absent normal   Right elbow absent absent normal   Left elbow absent absent normal   Right wrist absent absent normal   Left wrist absent absent normal   Right hand grasp absent absent normal   Left hand grasp absent absent normal   Right hip yes absent normal   Left hip yes absent normal   Right knee absent absent normal   Left knee absent absent normal   Right ankle absent absent normal   Left ankle absent absent normal   Right foot absent absent normal   Left foot absent absent normal             CONSULTS: neurosurgery    PROCEDURES: none    INJURIES:        Patient Active Problem List   Diagnosis    Fall at home, initial encounter    Subdural hematoma    Traumatic subarachnoid hemorrhage with loss of consciousness of 30 minutes or less ([facility] [address]    Encounter for palliative care    SAH (subarachnoid hemorrhage) (HCC)         Assessment/Plan:     -Close monitor of neuro and GCS  -Regular diet  -follow up thoracic, b/l femur X-ray      Supriya Dejesus DO  11/30/22, 6:08 PM

## 2022-11-30 NOTE — PROGRESS NOTES
Occupational Therapy  Facility/Department: [facility] CAR 1- SICU  Occupational Therapy Initial Assessment    Name: Dilip Blas  : 1940  MRN: [id number]  Date of Service: 2022    Discharge Recommendations:  Patient would benefit from continued therapy after discharge  OT Equipment Recommendations  Equipment Needed: Yes  Mobility Devices: ADL Assistive Devices  ADL Assistive Devices: Shower Chair with back       Patient Diagnosis(es): The encounter diagnosis was Subdural hematoma. Past Medical History:  has a past medical history of Non-smoker. Past Surgical History:  has no past surgical history on file. Assessment   Performance deficits / Impairments: Decreased functional mobility ; Decreased endurance;Decreased ADL status; Decreased balance;Decreased high-level IADLs;Decreased safe awareness  Prognosis: Good  Decision Making: Medium Complexity  REQUIRES OT FOLLOW-UP: Yes  Activity Tolerance  Activity Tolerance: Patient limited by pain; Patient limited by fatigue  Activity Tolerance Comments: Nausea        Plan   Occupational Therapy Plan  Times Per Week: 3-5x     Restrictions  Restrictions/Precautions  Restrictions/Precautions: Fall Risk, General Precautions  Required Braces or Orthoses?: No  Position Activity Restriction  Other position/activity restrictions: up with A, DNR-CCA, c-spine clear    Subjective   General  Patient assessed for rehabilitation services?: Yes  Family / Caregiver Present: Yes (Son, dtr, grandson)  Diagnosis: SAH/SDH-B/L, Fall at [facility], diverticulosis, H/A  Subjective  Subjective: \"Slight headache\" states pt, nausea after sitting EOB for >5 min, emotional support provided  General Comment  Comments: RN approved OT eval this date, pt cooperative throughout     Social/Functional History  Social/Functional History  Lives With: Alone  Type of Home: Mobile home  Home Layout: One level  Home Access: Stairs to enter with rails  Entrance Stairs - Number of Steps: 6  Entrance Stairs - RN received form and filled out and faxed back to [phone] number provided. RN scanned form into media tab in patient chart. RN awaiting response.          Electronically signed by Estuardo Chester RN on 7/21/22 at 8:02 AM EDT Rails: Both  Bathroom Shower/Tub: Tub/Shower unit  Bathroom Toilet: Standard  ADL Assistance: Independent  Homemaking Assistance: Independent  Homemaking Responsibilities: Yes  Ambulation Assistance: Independent  Transfer Assistance: Independent  Active : Yes  Mode of Transportation: Car  Occupation: Retired  Leisure & Hobbies: Muslim  Additional Comments: Dtr/son check on pt daily, dtr Vikki Davida states pt can return home with pt at d/c if necessary       Objective   SpO2: 96 %  O2 Device: None (Room air)             Safety Devices  Type of Devices: Patient at risk for falls; Bed alarm in place; Left in bed;Gait belt;Nurse notified  Restraints  Restraints Initially in Place: No  Bed Mobility Training  Bed Mobility Training: Yes  Supine to Sit: Moderate assistance  Sit to Supine: Moderate assistance (at BLE mainly)  Scooting: Contact-guard assistance  Balance  Sitting: With support (SUP-SBA at times, sat for ~15 min total unsupported, dizzy upon first sitting up with /66)  Standing:  (LITTLE d/t nausea, attempt next tx session)  Transfer Training  Transfer Training: Yes  Sit to Stand: Other (comment) (LITTLE d/t nausea)     AROM: Within functional limits  PROM: Within functional limits  Strength: Within functional limits (4+/5 grossly at BUE's, some confusion from pt about holding and resisting during shoulder flexion)  Coordination: Within functional limits  Tone: Normal  Sensation: Intact  ADL  Feeding: Modified independent ; Increased time to complete  Grooming: Supervision; Increased time to complete  UE Bathing: Supervision; Increased time to complete  LE Bathing: Contact guard assistance; Increased time to complete  UE Dressing: Supervision; Increased time to complete  LE Dressing: Minimal assistance; Increased time to complete  Toileting: Contact guard assistance; Increased time to complete  Additional Comments: Pt sat EOB and became dizzy and eventually nauseous, pt doffed R sock with good figure-4 technique, pt was about to be stood up when pt became nauseous and started returning to supine without notifying writer first, surprisingly pt stated this nausea is not new but happens sporatically at baseline when pt is anxious, doesn't eat well, etc. Pt noted to have dentures sitting on end-table, good BUE strength         Vision  Vision: Impaired  Vision Exceptions: Wears glasses at all times (has glaucoma)  Hearing  Hearing: Within functional limits  Cognition  Overall Cognitive Status: [facility]  Cognition Comment: At baseline cognitively, however, after pt became nauseous pt started to return to supine from EOB without notifying OTR first and became quiet-states this nausea is baseline however  Orientation  Overall Orientation Status: Within Functional Limits             Education Given To: Patient  Education Provided: Role of Therapy;Plan of Care;ADL Adaptive Strategies;Transfer Training  Education Method: Verbal;Demonstration  Barriers to Learning: Vision  Education Outcome: Continued education needed                                                         AM-PAC Score        AM-PAC Inpatient Daily Activity Raw Score: 19 (11/30/22 1644)  AM-PAC Inpatient ADL T-Scale Score : 40.22 (11/30/22 1644)  ADL Inpatient CMS 0-100% Score: 42.8 (11/30/22 1644)  ADL Inpatient CMS G-Code Modifier : CK (11/30/22 1644)    Goals  Short Term Goals  Time Frame for Short Term Goals: Pt will by discharge  Short Term Goal 1: demo dynamic sitting unsupported during func activity for 15 min+ at mod I  Short Term Goal 2: demo ADL UB/LB dressing/bathing activity at SUP, while sitting/standing as activity requires  Short Term Goal 3: demo BUE strength of 5/5 grossly for use in ADL performance  Short Term Goal 4: demo mod I with all bed mobility using bed rails PRN  Short Term Goal 5: notify OT to update goals as pt progresses to standing, etc.       Therapy Time   Individual Concurrent Group Co-treatment   Time In 1457         Time Out 1524         Minutes 27         Timed Code Treatment Minutes: 23 Minutes       Mindi Atkinson OTR/L

## 2022-11-30 NOTE — ED PROVIDER NOTES
[facility] CAR 1- SICU  eMERGENCY dEPARTMENT eNCOUnter   Attending Attestation     Pt Name: Chidi Alonso  MRN: [id number]  [place] 1940  Date of evaluation: 11/29/22       Chidi Alonso is a 78 y.o. female who presents with Fall      History: pt presents with fall at [facility] multiple ich and subdural noted from outlying facility. Pt has no other complaints. Exam: Heart rate and rhythm are regular. Lungs are clear to auscultation bilaterally. Abdomen soft, tender. Patient is awake alert at this time. Trauma priority called. We will get neurosurgical involvement. Plan for admission. I performed a history and physical examination of the patient and discussed management with the resident. I reviewed the residents note and agree with the documented findings and plan of care. Any areas of disagreement are noted on the chart. I was personally present for the key portions of any procedures. I have documented in the chart those procedures where I was not present during the key portions. I have personally reviewed all images and agree with the resident's interpretation. I have reviewed the emergency nurses triage note. I agree with the chief complaint, past medical history, past surgical history, allergies, medications, social and family history as documented unless otherwise noted below. Documentation of the HPI, Physical Exam and Medical Decision Making performed by medical students or scribes is based on my personal performance of the HPI, PE and MDM. For Phys Assistant/ Nurse Practitioner cases/documentation I have had a face to face evaluation of this patient and have completed at least one if not all key elements of the E/M (history, physical exam, and MDM). Additional findings are as noted. For APC cases I have personally evaluated and examined the patient in conjunction with the APC and agree with the treatment plan and disposition of the patient as recorded by the APC.     Naveen Houston MD  Attending Emergency  Physician       Luisa Villegas MD  11/29/22 0378 Chucky Pryor MD  11/29/22 0168

## 2022-11-30 NOTE — ACP (ADVANCE CARE PLANNING)
Advance Care Planning     General Advance Care Planning (ACP) Conversation    Date of Conversation: 11/29/2022  Conducted with:  Healthcare Decision Maker: Next of Kin by law (only applies in absence of above) (name)      Healthcare Decision Maker:  No healthcare decision makers have been documented. Click here to complete [address] including selection of the Healthcare Decision Maker Relationship (ie \"Primary\")   Today we documented Decision Maker(s) consistent with Legal Next of Kin hierarchy. Content/Action Overview:   Has ACP document(s) NOT on file - requested patient to provide  Reviewed DNR/DNI and patient elects DNR order - completed portable DNR form & placed order  treatment goals, ventilation preferences, and resuscitation preferences    Length of Voluntary ACP Conversation in minutes:  20 minutes

## 2022-11-30 NOTE — ED NOTES
Pt back to trauma A from CT.  Dr. Daniel Suarez at bedside to evaluate pt       Camryn Murguia RN  11/29/22 0014

## 2022-12-01 NOTE — CARE COORDINATION
SBIRT completed with pt - dtr and son in law present with pt permission    Pt denies alcohol/drug use. Pt does report recent feelings of depression. Stated she is not on any medication for depression. She denies any SI. She denies any changes in her eating/sleeping. She does admit to changes in her concentration. Pt stated her family is supportive and she talks with her dtr. Stated she has not let her PCP know how she was feeling - pt encouraged to inform her PCP. No prior counseling and pt declined any resources. Alcohol Screening and Brief Intervention        Recent Labs     11/29/22 2117   ALC <10       Alcohol Pre-screening     (WOMEN ONLY) How many times in the past year have you had 4 or more drinks in a day?: None    Alcohol Screening Audit       Drug Pre-Screening   How many times in the past year have you used a recreational drug or used a prescription medication for nonmedical reasons?: None    Drug Screening DAST       Mood Pre-Screening (PHQ-2)  During the past two weeks, have you been bothered by little interest or pleasure in doing things?: Yes  During the past two weeks, have you been bothered by feeling down, depressed, or hopeless?: Yes    Mood Pre-Screening (PHQ-9)  Total Score for PHQ-9: 3      I have interviewed Elidia Dominguez, [id number] regarding  Her alcohol consumption/drug use and risk for excessive use. Screenings were negative. Patient  N/A intervention at this time.    Deferred []    Completed on: 12/1/2022   MANOLO Clarke

## 2022-12-01 NOTE — PROGRESS NOTES
PROGRESS NOTE          PATIENT NAME: Zainab Wolf Weskan RECORD NO. [id number]  DATE: 2022  SURGEON: Dr Meeta Ba: Justo Gomez MD    HD: # 2    ASSESSMENT    Patient Active Problem List   Diagnosis    Fall at home, initial encounter    Subdural hematoma    Traumatic subarachnoid hemorrhage with loss of consciousness of 30 minutes or less ([facility] [address]    Encounter for palliative care    SAH (subarachnoid hemorrhage) ([facility] [address]       Right SAH and bilateral SDH with minimal rightward midline shift   Pain  Delirium  Need for seizure prophylaxis  Brain compression  TBI   NS: currently non-op   NEV f/u evaluate need for  DSA   Hold all antiplatelets and anticoagulants   Keppra 500 BID   Inderal for TBI x 28 days   Apap for pain    Hx htn  Relative hypotension   Home meds have been held   Resume as tolerated  home meds: held lisinopril, hydrochlorothiazide    Hyponatremia  Hypokalemia   Follow and replace   Nacl @ 50 ml/hr for hyponatremia    Protein calorie deficit   Supplements and encourage PO   Candidate for ng if not taking enough PO    Urinary retention on admission   Trial of void   Dc bridges    Palliative care encounter   DNR CCA   Obtaining [address] [place] [address] pprwrk      Continue critical care setting. Patient with fluctuating neuro exam and remains high risk for decompensation. Requiring active interventions for safety and medical decision making. Fu nev recs      Chief Complaint: \"headache\"    SUBJECTIVE    Barbi Payer is seen and examined this morning. OBJECTIVE  VITALS: Temp: Temp: 98.8 °F (37.1 °C)Temp  Av.2 °F (36.8 °C)  Min: 96.8 °F (36 °C)  Max: 99.1 °F (60.1 °C) BP Systolic (33TTA), QBS:123 , Min:93 , VKV:705   Diastolic (30OLE), FZC:23, Min:43, Max:69   Pulse Pulse  Av.8  Min: 58  Max: 99 Resp Resp  Avg: 15.7  Min: 14  Max: 21 Pulse ox SpO2  Av.9 %  Min: 94 %  Max: 100 %    Physical Exam  Constitutional:       Appearance: She is obese. HENT:      Head: Normocephalic. Nose: Nose normal.      Mouth/Throat:      Mouth: Mucous membranes are dry. Eyes:      Extraocular Movements: Extraocular movements intact. Cardiovascular:      Rate and Rhythm: Normal rate and regular rhythm. Pulmonary:      Effort: Pulmonary effort is normal.   Abdominal:      Palpations: Abdomen is soft. Musculoskeletal:         General: Normal range of motion. Cervical back: Normal range of motion. Skin:     General: Skin is warm. Capillary Refill: Capillary refill takes less than 2 seconds. Neurological:      General: No focal deficit present. Mental Status: She is alert. I/O last 3 completed shifts: In: 0   Out: 3050 [Urine:3050]    Drain/tube output:   In: -   Out: [address] [Urine:975]    LAB:  CBC:   Recent Labs     11/29/22 2117   WBC 16.4*   HGB 13.2   HCT 38.6   MCV 91.5          BMP:   Recent Labs     11/29/22 2117 11/30/22  0353 11/30/22  0501   * 129* 129*   K 3.9 4.0 3.7   CL 90* 94* 94*   CO2 23 21 20   BUN 13 10 10   CREATININE 0.74 0.66 0.70   GLUCOSE 116* 135* 126*       COAGS:   Recent Labs     11/29/22 2117   APTT 25.0   INR 1.0

## 2022-12-01 NOTE — PROGRESS NOTES
[facility] [address]  PROGRESS NOTE    Shift date: 12-1-22  Shift day: Thursday   Shift # 1    Room # 4930/4833-86   Name: Paulo Pichardo                Nondenominational: [address] of Taoism: Rangel Weeks     Referral: Routine Visit    Admit Date & Time: 11/29/2022  8:52 PM    Assessment:  Paulo Pichardo is a 78 y.o. female in the hospital because fell down possible brain bleed Upon entering the room writer observes, PT: was resting , mostly spoke to family, shared about PT's beliefs. Intervention:  Writer introduced self and title as  .  shared in presence, listening, prayer, communion. Outcome:  Family, PT: grateful for the visit. Plan:  Chaplains will remain available to offer spiritual and emotional support as needed. Electronically signed by Chaplain Leila, on 12/1/2022 at 3:41 PM.  [facility]  [phone]      12/01/22 1537   Encounter Summary   Service Provided For: Patient and family together   Referral/Consult From: Nurse   Support System Children;Family members   Last Encounter  12/01/22   Complexity of Encounter Moderate   Begin Time 1030   End Time  1110   Total Time Calculated 40 min   Encounter    Type Follow up   Spiritual/Emotional needs   Type Spiritual Support;Emotional Distress   Rituals, Rites and Sacraments   Type Christianity Communion   Assessment/Intervention/Outcome   Assessment Calm; Hopeful;Peaceful   Intervention Active listening;Discussed belief system/Mormon practices/rigoberto;Discussed illness injury and its impact; Discussed relationship with God;Explored/Affirmed feelings, thoughts, concerns;Prayer (assurance of)/[place];Sustaining Presence/Ministry of presence   Outcome Comfort;Engaged in conversation;Expressed feelings, needs, and concerns;Expressed Gratitude;Receptive

## 2022-12-01 NOTE — PROGRESS NOTES
Speech Language Pathology  Speech Language Pathology  [facility]    Speech Language Treatment Note    Date: 12/1/2022  Patients Name: Angy Pereyra  MRN: [id number]  Diagnosis:   Patient Active Problem List   Diagnosis Code    Fall at home, initial encounter W19. XXXA, Y92.009    Subdural hematoma S06. 5XAA    Traumatic subarachnoid hemorrhage with loss of consciousness of 30 minutes or less ([facility] [address] S06. 9K7S    Encounter for palliative care Z51.5    SAH (subarachnoid hemorrhage) ([facility]) I60.9       Pain: 0/10    Speech and Language Treatment  Treatment time: 2640-6352    Subjective: [] Alert [x] Cooperative     [] Confused     [] Agitated    [x] Lethargic      Objective/Assessment:  Auditory Comprehension: Moderate Y/n Questions: 5/5 independently  Complex Y/n Questions: 2/5 increased to 3/5 with max verbal cues  Complete the Sentence: 2/4 increased to 3/4 with mod verbal cue    Other: Pt. Appeared lethargic and required verbal cues to remain awake. ST ended session early due to pt. Complaining that her stomach hurt and she felt nauseated. RN notified. Plan:  [x] Continue ST services    [] Discharge from ST:      Discharge recommendations: []  Further therapy recommended at discharge. The patient should be able to tolerate at least 3 hours of therapy per day over 5 days or 15 hours over 7 days. [x] Further therapy recommended at discharge. [] No therapy recommended at discharge. Completed by: Michael Foreman  Clinician    Cosigned By: Nisha Bullock S.CCC/SLP

## 2022-12-01 NOTE — PROGRESS NOTES
Endovascular Neurosurgery Progress Note    SUBJECTIVE:     No acute event, patient seems tired but responded appropriately, son and daughter at the bedside    Review of Systems:  CONSTITUTIONAL:  negative for fevers, chills, fatigue and malaise    EYES:  negative for double vision, blurred vision and photophobia     HEENT:  negative for tinnitus, epistaxis and sore throat    RESPIRATORY:  negative for cough, shortness of breath, wheezing    CARDIOVASCULAR:  negative for chest pain, palpitations, syncope, edema    GASTROINTESTINAL:  negative for nausea, vomiting    GENITOURINARY:  negative for incontinence    MUSCULOSKELETAL:  negative for neck or back pain    NEUROLOGICAL:  Negative for weakness and tingling  negative for headaches and dizziness    PSYCHIATRIC:  negative for anxiety      Review of systems otherwise negative. OBJECTIVE:     Vitals:    12/01/22 1600   BP: (!) 140/56   Pulse: 57   Resp: 16   Temp:    SpO2: 98%        General:  Gen: normal habitus, NAD  HEENT: NCAT, mucosa moist  Cvs: RRR, S1 S2 normal  Resp: symmetric unlabored breathing  Abd: s/nd/nt  Ext: no edema  Skin: no lesions seen, warm and dry    Neuro:  Gen: awake and alert, oriented x3. Lang/speech: no aphasia or dysarthria. Follows commands. CN: PERRL, EOMI, VFF, V1-3 intact, face symmetric, hearing intact, shoulder shrug symmetric, tongue midline  Motor: grossly 5/5 UE and LE b/l  Sense: LT intact in all 4 ext. Coord: FTN and HTS intact b/l  DTR: deferred  Gait: deferred    NIH Stroke Scale:   1a  Level of consciousness: 0 - alert; keenly responsive   1b. LOC questions:  0 - answers both questions correctly   1c. LOC commands: 0 - performs both tasks correctly   2. Best Gaze: 0 - normal   3. Visual: 0 - no visual loss   4. Facial Palsy: 0 - normal symmetric movement   5a. Motor left arm: 0 - no drift, limb holds 90 (or 45) degrees for full 10 seconds   5b.   Motor right arm: 0 - no drift, limb holds 90 (or 45) degrees for full 10 seconds   6a. Motor left le - no drift; leg holds 30 degree position for full 5 seconds   6b  Motor right le - no drift; leg holds 30 degree position for full 5 seconds   7. Limb Ataxia: 0 - absent   8. Sensory: 0 - normal; no sensory loss   9. Best Language:  0 - no aphasia, normal   10. Dysarthria: 0 - normal   11. Extinction and Inattention: 0 - no abnormality         Total:   0     MRS: 4      LABS:   Reviewed. Lab Results   Component Value Date    HGB 11.2 (L) 2022    WBC 11.4 (H) 2022     2022     (L) 2022    BUN 9 2022    CREATININE 0.54 2022    MG 2.0 2022    APTT 25.0 2022    INR 1.0 2022      No results found for: COVID19    RADIOLOGY:   Images were personally reviewed including:    CT head done on the 22        ASSESSMENT:     78year old woman who lives by herself, with medical hx of HTN, fell down a flight of stair on the 22 and found to have unconscious. Patient does not remember who long she was down, but recalled that she had a headache before the fall. CT head showed acute B SDH and diffuse thick SAH, endovascular was consulted to do a DSA to r/o aneurysm. CTA head is negative so far. PLAN:     - will proceed with the DSA  - patient is [facility], during the procedure per hospital policy, [facility] will need to be held, this is explained to son and daughter in length in the present of the patient herself, daughter is POA and agree to it. Risks and benefits discussed, risks include but are not limited to bruising, stroke, subarachnoid hemorrhage, death, retroperitoneal hematoma, pseudoaneurysm, lower extremity/renal/peripheral vascular compromise, informed consent obtained. Case discussed with Dr. Carrillo Lauren attending.     Bonita Qureshi MD PGY 7  Stroke, [facility]  [address] [place] [address]

## 2022-12-01 NOTE — PLAN OF CARE
Problem: Discharge Planning  Goal: Discharge to home or other facility with appropriate resources  12/1/2022 1254 by Alea Ruiz RN  Outcome: Progressing  12/1/2022 1019 by Alea Ruiz RN  Outcome: Progressing  Flowsheets (Taken 12/1/2022 0800)  Discharge to home or other facility with appropriate resources:   Identify barriers to discharge with patient and caregiver   Arrange for needed discharge resources and transportation as appropriate   Identify discharge learning needs (meds, wound care, etc)     Problem: Pain  Goal: Verbalizes/displays adequate comfort level or baseline comfort level  12/1/2022 1254 by Alea Ruiz RN  Outcome: Progressing  Flowsheets (Taken 12/1/2022 1200)  Verbalizes/displays adequate comfort level or baseline comfort level:   Encourage patient to monitor pain and request assistance   Assess pain using appropriate pain scale   Administer analgesics based on type and severity of pain and evaluate response  12/1/2022 1019 by Alea Ruiz RN  Outcome: Progressing  Flowsheets (Taken 12/1/2022 0800)  Verbalizes/displays adequate comfort level or baseline comfort level:   Encourage patient to monitor pain and request assistance   Assess pain using appropriate pain scale   Administer analgesics based on type and severity of pain and evaluate response     Problem: Safety - Adult  Goal: Free from fall injury  12/1/2022 1254 by Alea Ruiz RN  Outcome: Progressing  12/1/2022 1019 by Alea Ruiz RN  Outcome: Progressing     Problem: ABCDS Injury Assessment  Goal: Absence of physical injury  12/1/2022 1254 by Alea Ruiz RN  Outcome: Progressing  12/1/2022 1019 by Alea Ruiz RN  Outcome: Progressing     Problem: Skin/Tissue Integrity  Goal: Absence of new skin breakdown  Description: 1. Monitor for areas of redness and/or skin breakdown  2. Assess vascular access sites hourly  3. Every 4-6 hours minimum:  Change oxygen saturation probe site  4. Every 4-6 hours:  If on nasal continuous positive airway pressure, respiratory therapy assess nares and determine need for appliance change or resting period.   12/1/2022 1254 by Judy Chung RN  Outcome: Progressing  12/1/2022 1019 by Judy Chung RN  Outcome: Progressing     Problem: Chronic Conditions and Co-morbidities  Goal: Patient's chronic conditions and co-morbidity symptoms are monitored and maintained or improved  12/1/2022 1254 by Judy Chung RN  Outcome: Progressing  12/1/2022 1019 by Judy Chung RN  Outcome: Progressing  Flowsheets (Taken 12/1/2022 0800)  Care Plan - Patient's Chronic Conditions and Co-Morbidity Symptoms are Monitored and Maintained or Improved: Monitor and assess patient's chronic conditions and comorbid symptoms for stability, deterioration, or improvement

## 2022-12-01 NOTE — CONSULTS
[facility] Inpatient Note  [place] MAN[place]   12/1/2022  1:10 PM  Chidi Alonso  1940  [id number]    Therapist stopped by pt room to follow up on [facility] consult. Pt was sleeping and pt's family did not want her to be woken up as she has been tired today. Per chart pt had endorsed some depression but denied resources. Therapist offered brief support to pt's family and led them in processing thoughts and feelings. Family is doing \"okay\" and are planning next steps. Per family pt will have a procedure tomorrow. Pending pt's inpatient stay, therapist will attempt to see pt on Monday 12/5/2022.

## 2022-12-01 NOTE — PROGRESS NOTES
Physical Therapy        Physical Therapy Cancel Note      DATE: 2022    NAME: Marge Mederos  MRN: [id number]   : 1940      Patient not seen this date for Physical Therapy due to: After discussion with family at bed side, pt is resting, increased complaint of HA and nausea with angiogram pending. Family prefer to await further diagnostic testing and plan or pt more awake and able to participate. Nursing notified. PT will continue to follow and address as indicated.       Electronically signed by Sven Ellis PT on 2022 at 4:06 PM

## 2022-12-01 NOTE — PROGRESS NOTES
Neurosurgery JAMES/Resident    Daily Progress Note   Chief Complaint   Patient presents with    Fall     12/1/2022  3:30 PM    Chart reviewed. No acute events overnight. Nausea this AM. Headache. Vitals:    12/01/22 1300 12/01/22 1310 12/01/22 1400 12/01/22 1500   BP: (!) 166/52 (!) 134/49  (!) 147/50   Pulse: 62 62 58 59   Resp: 15 12 17 10   Temp:       TempSrc:       SpO2: 95%  97% 98%   Weight:       Height:             PE:   Alert, oriented to self only, states \"1922\"  Follow all commands  E4 V4 M6  Motor   L deltoid 5/5; R deltoid 5/5  L biceps 5/5; R biceps 5/5  L triceps 5/5; R triceps 5/5  L intrinsics 5/5; R intrinsics 5/5      L iliopsoas 5/5 , R iliopsoas 5/5  L quadriceps 5/5; R quadriceps 5/5  L Dorsiflexion 5/5; R dorsiflexion 5/5  L Plantarflexion 5/5; R plantarflexion 5/5    Sensation intact      Lab Results   Component Value Date    WBC 11.4 (H) 12/01/2022    HGB 11.2 (L) 12/01/2022    HCT 33.4 (L) 12/01/2022     12/01/2022     (L) 12/01/2022    K 3.4 (L) 12/01/2022    CL 96 (L) 12/01/2022    CREATININE 0.54 12/01/2022    BUN 9 12/01/2022    CO2 23 12/01/2022    TSH 1.19 11/29/2022    INR 1.0 11/29/2022    LABA1C 5.2 11/29/2022       Radiology   CT HEAD WO CONTRAST    Result Date: 11/30/2022  EXAMINATION: CT OF THE HEAD WITHOUT CONTRAST  11/30/2022 12:26 pm TECHNIQUE: CT of the head was performed without the administration of intravenous contrast. Automated exposure control, iterative reconstruction, and/or weight based adjustment of the mA/kV was utilized to reduce the radiation dose to as low as reasonably achievable. COMPARISON: CT brain performed 11/30/2022. HISTORY: ORDERING SYSTEM PROVIDED HISTORY: reduced GCS TECHNOLOGIST PROVIDED HISTORY: reduced GCS FINDINGS: BRAIN/VENTRICLES: There is redemonstration of a left-sided subdural hemorrhage that is not significantly changed in size compared to prior examination.   There is a small right-sided subdural hemorrhage that is similar in size compared to prior examination. There is scattered subarachnoid hemorrhage within the cerebral hemispheres bilaterally that is similar compared to prior examination. There is no significant mass effect or midline shift. The infratentorial structures are unremarkable. ORBITS: The visualized portion of the orbits demonstrate no acute abnormality. SINUSES: The visualized paranasal sinuses and mastoid air cells demonstrate no acute abnormality. SOFT TISSUES/SKULL:  There is similar scalp soft tissue swelling in the left parietal region. Bilateral subdural hemorrhage with the left being larger than the right. Overall these are similar in size compared to prior examination. Subarachnoid hemorrhage within the cerebral hemispheres bilaterally that is more pronounced on the right compared to the left. Overall this is similar compared to prior examination. CT HEAD WO CONTRAST    Result Date: 11/30/2022  EXAMINATION: CT OF THE HEAD WITHOUT CONTRAST  11/30/2022 4:32 am TECHNIQUE: CT of the head was performed without the administration of intravenous contrast. Automated exposure control, iterative reconstruction, and/or weight based adjustment of the mA/kV was utilized to reduce the radiation dose to as low as reasonably achievable. COMPARISON: 11/29/2022. HISTORY: ORDERING SYSTEM PROVIDED HISTORY: sah, sdh TECHNOLOGIST PROVIDED HISTORY: sah, sdh FINDINGS: BRAIN/VENTRICLES: Again noted and unchanged is copious right hemispheric subarachnoid hemorrhage with minimal anterior to anterolateral right subdural hematoma. There also appears to be subdural hematoma extending along the right tentorial leaflet. This is minimal and although not mentioned on the report for the earlier exam, it is unchanged. Also unchanged is the more prominent left convexity subdural hematoma with maximum thickness of 13 mm. No significant midline shift. No parenchymal hemorrhage identified.   Mild diffuse cerebral atrophy and chronic white matter ischemic change. ORBITS: The visualized portion of the orbits demonstrate no acute abnormality. SINUSES: The visualized paranasal sinuses and mastoid air cells demonstrate no acute abnormality. SOFT TISSUES/SKULL:  Osseous structures appear intact. Left posterolateral scalp hematoma is noted. No significant interval change since the exam approximately 7 hours earlier with fairly extensive extra-axial hemorrhage as described above. CT HEAD WO CONTRAST    Result Date: 11/29/2022  EXAMINATION: CT OF THE HEAD WITHOUT CONTRAST  11/29/2022 9:12 pm TECHNIQUE: CT of the head was performed without the administration of intravenous contrast. Automated exposure control, iterative reconstruction, and/or weight based adjustment of the mA/kV was utilized to reduce the radiation dose to as low as reasonably achievable. COMPARISON: None. HISTORY: ORDERING SYSTEM PROVIDED HISTORY: fall TECHNOLOGIST PROVIDED HISTORY: fall Reason for Exam: trauma fall Relevant Medical/Surgical History: pt received contrast 2 hours ago, trauma attending override due to neurological changes FINDINGS: BRAIN/VENTRICLES: Copious subarachnoid hemorrhage is present at the right anterior and lateral aspects of the cerebral hemisphere most prominently involving the sylvian fissure region. There is also superimposed anterior to anterolateral right convexity minor subdural hematoma. More prominent more diffuse left convexity subdural hematoma is present with maximum thickness of approximately 13 mm as measured on coronal image 55. There is resulting 2-3 mm rightward midline shift. The ventricular system is unremarkable. There is mild diffuse cerebral atrophy and chronic white matter ischemic change. ORBITS: The visualized portion of the orbits demonstrate no acute abnormality. SINUSES: The visualized paranasal sinuses and mastoid air cells demonstrate no acute abnormality. SOFT TISSUES/SKULL:  Osseous structures appear intact.   This includes the lateral aspect of the left occipital bone. There is a small to moderate localized scalp hematoma at the left posterolateral aspect. Right subarachnoid and bilateral subdural hematomas as described with minimal rightward midline shift. Mild diffuse cerebral atrophy and chronic white matter ischemic change. Left posterolateral scalp hematoma. Critical results were called by Dr. Rasheeda Felipe to Dr. Jigar Raya On 11/29/2022 at 22:37. A/P  78 y.o. female who presents with subarachnoid hemorrhage and subdural hematoma     - No neurosurgical interventions needed at this time. Continue to monitor neuro status closely. - repeat CT head with any neuro changes   - Neuro endovascular- possible DSA tomorrow eval for aneurysm   - activity as tolerated, PT and OT       Please contact neurosurgery with any changes in patients neurologic status.        Carmel Abbasi CNP  12/1/22  3:30 PM

## 2022-12-01 NOTE — PLAN OF CARE
Problem: Discharge Planning  Goal: Discharge to home or other facility with appropriate resources  Outcome: Progressing  Flowsheets (Taken 12/1/2022 0800)  Discharge to home or other facility with appropriate resources:   Identify barriers to discharge with patient and caregiver   Arrange for needed discharge resources and transportation as appropriate   Identify discharge learning needs (meds, wound care, etc)     Problem: Pain  Goal: Verbalizes/displays adequate comfort level or baseline comfort level  Outcome: Progressing  Flowsheets (Taken 12/1/2022 0800)  Verbalizes/displays adequate comfort level or baseline comfort level:   Encourage patient to monitor pain and request assistance   Assess pain using appropriate pain scale   Administer analgesics based on type and severity of pain and evaluate response     Problem: Safety - Adult  Goal: Free from fall injury  Outcome: Progressing     Problem: ABCDS Injury Assessment  Goal: Absence of physical injury  Outcome: Progressing     Problem: Skin/Tissue Integrity  Goal: Absence of new skin breakdown  Description: 1. Monitor for areas of redness and/or skin breakdown  2. Assess vascular access sites hourly  3. Every 4-6 hours minimum:  Change oxygen saturation probe site  4. Every 4-6 hours:  If on nasal continuous positive airway pressure, respiratory therapy assess nares and determine need for appliance change or resting period.   Outcome: Progressing     Problem: Chronic Conditions and Co-morbidities  Goal: Patient's chronic conditions and co-morbidity symptoms are monitored and maintained or improved  Outcome: Progressing  Flowsheets (Taken 12/1/2022 0800)  Care Plan - Patient's Chronic Conditions and Co-Morbidity Symptoms are Monitored and Maintained or Improved: Monitor and assess patient's chronic conditions and comorbid symptoms for stability, deterioration, or improvement

## 2022-12-02 NOTE — PROGRESS NOTES
[facility]  PROGRESS NOTE    Shift date: 12/02/22    Shift day: Friday   Shift # 1    Room # 8861/9204-42   Name: Chidi Alonso                Gnosticist: Scientology       Referral:  nurse per Spiritual Consult    Admit Date & Time: 11/29/2022  8:52 PM    Assessment:  Chidi Alonso is a 78 y.o. female in the hospital.  responded to patient's room per Spiritual Consult from nurse.  connected with nurse prior to entering room and learned that the patient had requested a  visit, which was the likely reason for the consult; a  was able to visit with patient and provide anointing.  entered the room and observed the patient to be experiencing feelings of anxiety. The patient expressed distress and concerns with physical discomfort. A family member was present in the room and appeared to be experiencing feelings of anxiety and sadness. Intervention:  Writer introduced self and title as  Writer offered space for the patient and family member  to express feelings, needs, and concerns and provided a ministry presence. Outcome:  Patient vented emotion. Plan:  Chaplains will remain available to offer spiritual and emotional support as needed.        12/02/22 1059   Encounter Summary   Service Provided For: Patient and family together   Referral/Consult From: Nurse   Support System Children   Last Encounter  12/02/22   Complexity of Encounter Moderate   Begin Time 1050   End Time  1100   Total Time Calculated 10 min   Spiritual/Emotional needs   Type Spiritual Support   Assessment/Intervention/Outcome   Assessment Concerns with suffering   Intervention Sustaining Presence/Ministry of presence   Outcome Venting emotion     Electronically signed by Morgan Rice, on 12/2/2022 at 11:00 AM.  [facility]  [phone]

## 2022-12-02 NOTE — PROGRESS NOTES
Endovascular Neurosurgery Progress Note    SUBJECTIVE:     No acute event, no overnight event, patient is more awake today and cheerful. Patient understand she is waiting for the DSA today at 1pm.    Review of Systems:  CONSTITUTIONAL:  negative for fevers, chills, fatigue and malaise    EYES:  negative for double vision, blurred vision and photophobia     HEENT:  negative for tinnitus, epistaxis and sore throat    RESPIRATORY:  negative for cough, shortness of breath, wheezing    CARDIOVASCULAR:  negative for chest pain, palpitations, syncope, edema    GASTROINTESTINAL:  negative for nausea, vomiting    GENITOURINARY:  negative for incontinence    MUSCULOSKELETAL:  negative for neck or back pain    NEUROLOGICAL:  Negative for weakness and tingling  negative for headaches and dizziness    PSYCHIATRIC:  negative for anxiety      Review of systems otherwise negative. OBJECTIVE:     Vitals:    12/02/22 0800   BP: (!) 124/52   Pulse: 51   Resp: 17   Temp:    SpO2: 98%        General:  Gen: normal habitus, NAD  HEENT: NCAT, mucosa moist  Cvs: RRR, S1 S2 normal  Resp: symmetric unlabored breathing  Abd: s/nd/nt  Ext: no edema  Skin: no lesions seen, warm and dry    Neuro:  Gen: awake and alert, oriented x3. Lang/speech: no aphasia or dysarthria. Follows commands. CN: PERRL, EOMI, VFF, V1-3 intact, face symmetric, hearing intact, shoulder shrug symmetric, tongue midline  Motor: grossly 5/5 UE and LE b/l  Sense: LT intact in all 4 ext. Coord: FTN and HTS intact b/l  DTR: deferred  Gait: deferred    NIH Stroke Scale:   1a  Level of consciousness: 0 - alert; keenly responsive   1b. LOC questions:  0 - answers both questions correctly   1c. LOC commands: 0 - performs both tasks correctly   2. Best Gaze: 0 - normal   3. Visual: 0 - no visual loss   4. Facial Palsy: 0 - normal symmetric movement   5a. Motor left arm: 0 - no drift, limb holds 90 (or 45) degrees for full 10 seconds   5b.   Motor right arm: 0 - no drift, limb holds 90 (or 45) degrees for full 10 seconds   6a. Motor left le - no drift; leg holds 30 degree position for full 5 seconds   6b  Motor right le - no drift; leg holds 30 degree position for full 5 seconds   7. Limb Ataxia: 0 - absent   8. Sensory: 0 - normal; no sensory loss   9. Best Language:  0 - no aphasia, normal   10. Dysarthria: 0 - normal   11. Extinction and Inattention: 0 - no abnormality         Total:   0     MRS: 4      LABS:   Reviewed. Lab Results   Component Value Date    HGB 10.4 (L) 2022    WBC 10.9 2022     2022     (L) 2022    BUN 9 2022    CREATININE 0.54 2022    MG 2.0 2022    APTT 25.0 2022    INR 1.0 2022      No results found for: COVID19    RADIOLOGY:   Images were personally reviewed including:    CT head done on the 22        ASSESSMENT:     78year old woman who lives by herself, with medical hx of HTN, fell down a flight of stair on the 22 and found to have unconscious. Patient does not remember who long she was down, but recalled that she had a headache before the fall. CT head showed acute B SDH and diffuse thick SAH, endovascular was consulted to do a DSA to r/o aneurysm. CTA head is negative so far. PLAN:     - will proceed with the DSA at 1pm today    - patient is [facility], during the procedure per hospital policy, [facility] will need to be held, this is explained to son and daughter in length in the present of the patient herself, daughter is POA and agree to it. Risks and benefits discussed, risks include but are not limited to bruising, stroke, subarachnoid hemorrhage, death, retroperitoneal hematoma, pseudoaneurysm, lower extremity/renal/peripheral vascular compromise, informed consent obtained. Case discussed with Dr. Giorgio Betancourt attending.     Ry Rivera MD PGY 7  Stroke, [facility] Stroke [address] [place] [address] [phone]   Electronically signed 12/2/2022 at 8:36 AM

## 2022-12-02 NOTE — CARE COORDINATION
Pt currently sleeping. Met with pt's daughter, Echo Cervantes, discussed possible ARU and per Echo Cervantes, she will review the list and will discuss with her mother. Echo Cervantes also requested a SNF list as she is concerned about the ARU being too far from home, with the possibility of winter weather coming as she is pt's primary support.

## 2022-12-02 NOTE — PROGRESS NOTES
[facility]  Speech Language Pathology    Date: 12/2/2022  Patient Name: Sharla Gan  YOB: 1940   AGE: 78 y.o. MRN: [id number]        Patient Not Available for Speech Therapy     Due to:  [] Testing  [] Hemodialysis  [] Cancelled by RN  [] Surgery   [] Intubation/Sedation/Pain Medication  [] Medical instability  [x] Other: Pt. Using the bedpan. Next scheduled treatment: 12/2 in PM if able, 12/5  Completed by: Kemar Guthrie SLP, M.A.  SHAY-SLP

## 2022-12-02 NOTE — PROGRESS NOTES
Neurosurgery JAMES/Resident    Daily Progress Note   Chief Complaint   Patient presents with    Fall     12/2/2022  4:20 PM    Chart reviewed. No acute events overnight. Mild headache and some nausea but improved from yesterday. More alert and oriented today compared to yesterday. Vitals:    12/02/22 1515 12/02/22 1530 12/02/22 1545 12/02/22 1600   BP: (!) 142/79 (!) 151/57 (!) 155/51 (!) 164/121   Pulse: 58 60 56 68   Resp: 12 14 18 17   Temp:       TempSrc:       SpO2: 97% 95% 96% 97%   Weight:       Height:             PE: AOx3   CNII-XII intact   PERRL, EOMI   Motor   L deltoid 5/5; R deltoid 5/5  L biceps 5/5; R biceps 5/5  L triceps 5/5; R triceps 5/5  L intrinsics 5/5; R intrinsics 5/5      L iliopsoas 5/5 , R iliopsoas 5/5  L quadriceps 5/5; R quadriceps 5/5  L Dorsiflexion 5/5; R dorsiflexion 5/5  L Plantarflexion 5/5; R plantarflexion 5/5    Sensation intact      Lab Results   Component Value Date    WBC 10.9 12/02/2022    HGB 10.4 (L) 12/02/2022    HCT 31.6 (L) 12/02/2022     12/02/2022     (L) 12/02/2022    K 3.4 (L) 12/02/2022     12/02/2022    CREATININE 0.44 (L) 12/02/2022    BUN 9 12/02/2022    CO2 18 (L) 12/02/2022    TSH 1.19 11/29/2022    INR 1.0 11/29/2022    LABA1C 5.2 11/29/2022         A/P  78 y.o. female who presents with subarachnoid hemorrhage and subdural hematoma     - No neurosurgical interventions needed at this time. Continue to monitor neuro status closely. - repeat CT head with any neuro changes  - Neuro endovascular- DSA today  - activity as tolerated, PT and OT       Please contact neurosurgery with any changes in patients neurologic status.        Andrew Nagel CNP  12/2/22  4:20 PM

## 2022-12-02 NOTE — PROGRESS NOTES
Physical Therapy        Physical Therapy Cancel Note      DATE: 2022    NAME: Noah Owen  MRN: [id number]   : 1940      Patient not seen this date for Physical Therapy due to: Other: Pt out of room at IR , will check back on 12/3.        Electronically signed by Nidia Youssef PT on 2022 at 2:46 PM

## 2022-12-02 NOTE — DISCHARGE INSTR - COC
Continuity of Care Form    Patient Name: Chidi Alonso   :  1940  MRN:  [id number]    Admit date:  2022  Discharge date:  ***    Code Status Order: DNR-CCA   Advance Directives:     Admitting Physician:  Demetrio Becker MD  PCP: Hilario Paul MD    Discharging Nurse: [facility] Unit/Room#: 1009/1009-01  Discharging Unit Phone Number: ***    Emergency Contact:   Extended Emergency Contact Information  Primary Emergency Contact: Eliel Steve  Mobile Phone: [phone]  Relation: Child  Preferred language: English   needed? No  Secondary Emergency Contact: wilfrido tinsley  Home Phone: [phone]  Relation: Child    Past Surgical History:  No past surgical history on file. Immunization History:   Immunization History   Administered Date(s) Administered    COVID-19, PFIZER PURPLE top, DILUTE for use, (age 15 y+), 30mcg/0.3mL 2021, 2021, 10/18/2021       Active Problems:  Patient Active Problem List   Diagnosis Code    Fall at home, initial encounter W19. XXXA, Y92.009    Subdural hematoma S06. 5XAA    Traumatic subarachnoid hemorrhage with loss of consciousness of 30 minutes or less ([facility] [address] S06. 5L7T    Encounter for palliative care Z51.5    SAH (subarachnoid hemorrhage) ([facility]) I60.9       Isolation/Infection:   Isolation            No Isolation          Patient Infection Status       None to display            Nurse Assessment:  Last Vital Signs: BP (!) 162/53   Pulse 59   Temp 99 °F (37.2 °C) (Oral)   Resp 15   Ht 4' 9\" (1.448 m)   Wt 145 lb (65.8 kg)   SpO2 99%   BMI 31.38 kg/m²     Last documented pain score (0-10 scale): Pain Level: 0  Last Weight:   Wt Readings from Last 1 Encounters:   22 145 lb (65.8 kg)     Mental Status:  {IP PT MENTAL STATUS:}    IV Access:  { LEENA IV ACCESS:843656008}    Nursing Mobility/ADLs:  Walking   {CHP DME TNIE:715679950}  Transfer  {CHP DME FUCN:683666097}  Bathing  {CHP DME RFJL:427288778}  Dressing  {CHP DME DKRW:693428563}  Toileting  {[facility] DME JQUE:323745655}  Feeding  {[facility] DME PJU}  Med Admin  {[facility] DME KSRT:053742028}  Med Delivery   { LEENA MED Delivery:385487219}    Wound Care Documentation and Therapy:        Elimination:  Continence: Bowel: {YES / CZ:08359}  Bladder: {YES / AO:34134}  Urinary Catheter: {Urinary Catheter:763210084}   Colostomy/Ileostomy/Ileal Conduit: {YES / IC:90903}       Date of Last BM: ***    Intake/Output Summary (Last 24 hours) at 2022 1339  Last data filed at 2022 1850  Gross per 24 hour   Intake 593.77 ml   Output 175 ml   Net 418.77 ml     I/O last 3 completed shifts: In: 2511.7 [P.O.:170;  I.V.:2067.4; IV Piggyback:274.3]  Out: 425 [Urine:425]    Safety Concerns:     { LEENA Safety Concerns:157348577}    Impairments/Disabilities:      [address] [facility] Impairments/Disabilities:015063500}    Nutrition Therapy:  Current Nutrition Therapy:   [address] [employer] Diet List:628583633}    Routes of Feeding: {[facility] Other Feedings:587307350}  Liquids: {Slp liquid thickness:68419}  Daily Fluid Restriction: {[facility] DME Yes amt example:826293781}  Last Modified Barium Swallow with Video (Video Swallowing Test): {Done Not Done VSEN:519639686}    Treatments at the Time of Hospital Discharge:   Respiratory Treatments: ***  Oxygen Therapy:  {Therapy; copd oxygen:83231}  Ventilator:    {[facility] Vent ODIX:507829345}    Rehab Therapies: {THERAPEUTIC INTERVENTION:5190967703}  Weight Bearing Status/Restrictions: [address] [employer]  Weight Bearin}  Other Medical Equipment (for information only, NOT a DME order):  {EQUIPMENT:373206339}  Other Treatments: ***    Patient's personal belongings (please select all that are sent with patient):  {[facility] DME Belongings:320342969}    RN SIGNATURE:  {Esignature:989769475}    CASE MANAGEMENT/SOCIAL WORK SECTION    Inpatient Status Date: ***    Readmission Risk Assessment Score:  Readmission Risk              Risk of Unplanned Readmission:  10           Discharging to Facility/ Agency   Name:   Address:  Phone:  Fax:    Dialysis Facility (if applicable)   Name:  Address:  Dialysis Schedule:  Phone:  Fax:    / signature: {Esignature:237917303}    PHYSICIAN SECTION    Prognosis: Terminal    Condition at Discharge: Guarded    Rehab Potential (if transferring to Rehab):     Recommended Labs or Other Treatments After Discharge:     Physician Certification: I certify the above information and transfer of Junie Pak  is necessary for the continuing treatment of the diagnosis listed and that she requires Hospice for less 30 days.      Update Admission H&P: No change in H&P    PHYSICIAN SIGNATURE:  Electronically signed by PANTERA Erickson CNP on 12/5/22 at 2:34 PM EST

## 2022-12-02 NOTE — PROGRESS NOTES
ICU PROGRESS NOTE  PATIENT NAME: Zainab Pryor RECORD NO. [id number]  DATE: 12/2/2022    PRIMARY CARE PHYSICIAN: Justo Gomez MD    HD: # 3    ASSESSMENT    Patient Active Problem List   Diagnosis    Fall at home, initial encounter    Subdural hematoma    Traumatic subarachnoid hemorrhage with loss of consciousness of 30 minutes or less ([facility] [address]    Encounter for palliative care    SAH (subarachnoid hemorrhage) [facility])       MEDICAL DECISION MAKING AND PLAN  Neuro:  Injuries: Right SAH and bilateral SDH with minimal rightward midline shift   GCS 14-15  Neuro-endovascular team to do diagnostic angiogram today, DNR-CCA to be held during the procedure  Neurosurgery: no operative intervention indicated  Continue to hold AC and AP  Keppra 500 BID  Inderal x 28 days  Pain control: Tyl  CV/Heme  HR 53-66  -142  Home meds: held lisinopril, hydrochlorothiazide  Not on levo or vaso  H/H: 10.4/31.6 (11.2/33. 4)  Pulm  Room air  Continue to encourage incentive spirometer  GI/Nutrition  Diet: NPO for DSA today  Will need to discuss enteral nutrition access as pt did not have enough PO intake yesterday. Will plan on discussing NG tube with family today  Bowel regimen: Glycolax, Senna  Renal/lytes  Bun/Cr: 9/0.44  Na/K 130/3.4, will replace potassium  NS 50 mL/hr overnight for NPO  UOP: voiding spontaneously but not able to make it in the hat for measurement  7.    Endocrine        1.   Musculoskeletal  TERT complete  Family/dispo  Family at bedside yesterday, understanding pt may need tube feeds   Lines  Peripheral IV      CHECKLIST    CAM-ICU RASS: +1  RESTRAINTS: Not required  IVF: 48 NS for NPO  NUTRITION: NPO, will need to discuss enteral access as PO intake has been poor  ANTIBIOTICS: Not indicated  GI: NPO  DVT: Held per neuroendovascular   GLYCEMIC CONTROL: glucose within normal limits  HOB >45: if comfortable  MOBILITY: with assistance  SBT: Not applicable  IS: Pt confused by how IS works, needs bedside nursing assistance    Chief Complaint: \"Feeling good\"    SUBJECTIVE    Torito Factor  had no acute overnight events. She ate a bit of jello and soup yesterday however not enough PO intake. Pt is NPO for DSA today with neuroendovascular team. Pt has no complaints of chest pain, shortness of breath, headache or dizziness. Pt is voiding spontaneously and passing gas. OBJECTIVE  VITALS: Temp: Temp: 99 °F (37.2 °C)Temp  Av.7 °F (37.1 °C)  Min: 97.5 °F (36.4 °C)  Max: 99.2 °F (75.5 °C) BP Systolic (37TSZ), VUF:515 , Min:75 , QFX:955   Diastolic (03GRP), EUC:23, Min:47, Max:74   Pulse Pulse  Av.7  Min: 54  Max: 62 Resp Resp  Av.3  Min: 10  Max: 19 Pulse ox SpO2  Av.9 %  Min: 94 %  Max: 98 %    GENERAL: sleepy, will smile and answer questions appropriately  NEURO: GCS 14-15,   HEENT: normocephalic atraumatic  LUNGS: clear to auscultation bilaterally, no increased work of breathing  HEART: normal rate and regular rhythm  ABDOMEN: soft, non-tender, non-distended  EXTREMITY: no cyanosis, clubbing or edema    LAB:  CBC:   Recent Labs     22  2117 22  0850   WBC 16.4* 11.4*   HGB 13.2 11.2*   HCT 38.6 33.4*   MCV 91.5 94.4    213     BMP:   Recent Labs     22  0353 22  0501 22  0850   * 129* 131*   K 4.0 3.7 3.4*   CL 94* 94* 96*   CO2 21 20 23   BUN 10 10 9   CREATININE 0.66 0.70 0.54   GLUCOSE 135* 126* 106*       Hemant Davidson DO  22, 7:30 AM            Trauma Attending Attestation      I have reviewed the above GCS note(s) and confirmed the key elements of the medical history and physical exam. I have seen and examined the pt. I have discussed the findings, established the care plan and recommendations with Resident.       Lainey Gutierrez DO  2022  5:37 PM

## 2022-12-03 NOTE — PROGRESS NOTES
Neurosurgery JAMES/Resident    Daily Progress Note   CC:  Chief Complaint   Patient presents with    Fall     12/3/2022  12:10 PM    Chart reviewed. No acute events overnight. No new complaints. Patient seen and examined this morning, resting in bed with family at bedside, denies headache nausea vomiting and weakness   Vitals:    12/03/22 0700 12/03/22 0800 12/03/22 0900 12/03/22 1000   BP: (!) 141/64 (!) 150/67 (!) 154/47 (!) 147/54   Pulse: 56 63 54 64   Resp: 14 19 17 16   Temp:  98.2 °F (36.8 °C)     TempSrc:  Oral     SpO2: 98% 92% 96% 97%   Weight:       Height:           PE: Alert and orinted x3  Following all commands  5/5 BUE and BLE  No weakness noted   PERRL, EOMI  Cranial Nerves:    II: Visual acuity:  normal  III: Pupils:  equal, round, reactive to light  III,IV,VI: Extra Ocular Movements:intact  V: Facial sensation:  intact  VII: Facial strength: intact  VIII: Hearing:  intact  IX: Palate:  intact  XI: Shoulder shrug: intact  XII: Tongue movement: intact        Sensation: intact       Lab Results   Component Value Date    WBC 9.1 12/03/2022    HGB 10.2 (L) 12/03/2022    HCT 29.9 (L) 12/03/2022     12/03/2022     (L) 12/03/2022    K 3.4 (L) 12/03/2022     12/03/2022    CREATININE 0.47 (L) 12/03/2022    BUN 9 12/03/2022    CO2 21 12/03/2022    TSH 1.19 11/29/2022    INR 1.0 11/29/2022    LABA1C 5.2 11/29/2022       Radiology   XR FEMUR LEFT (MIN 2 VIEWS)    Result Date: 11/30/2022  EXAMINATION: 2  XRAY VIEWS OF THE RIGHT FEMUR;  2 XRAY VIEWS OF THE LEFT FEMUR 11/30/2022 6:48 pm COMPARISON: None. HISTORY: ORDERING SYSTEM PROVIDED HISTORY: trauma TECHNOLOGIST PROVIDED HISTORY: trauma FINDINGS: There is no evidence of acute fracture. There is normal alignment. No acute joint abnormality. No focal osseous lesion. No focal soft tissue abnormality. No acute osseous abnormality.   If there is clinical suspicion for radiographically occult fracture and the patient is unable to bear weight, recommend MRI. XR FEMUR RIGHT (MIN 2 VIEWS)    Result Date: 11/30/2022  EXAMINATION: 2  XRAY VIEWS OF THE RIGHT FEMUR;  2 XRAY VIEWS OF THE LEFT FEMUR 11/30/2022 6:48 pm COMPARISON: None. HISTORY: ORDERING SYSTEM PROVIDED HISTORY: trauma TECHNOLOGIST PROVIDED HISTORY: trauma FINDINGS: There is no evidence of acute fracture. There is normal alignment. No acute joint abnormality. No focal osseous lesion. No focal soft tissue abnormality. No acute osseous abnormality. If there is clinical suspicion for radiographically occult fracture and the patient is unable to bear weight, recommend MRI. CT HEAD WO CONTRAST    Result Date: 11/30/2022  EXAMINATION: CT OF THE HEAD WITHOUT CONTRAST  11/30/2022 12:26 pm TECHNIQUE: CT of the head was performed without the administration of intravenous contrast. Automated exposure control, iterative reconstruction, and/or weight based adjustment of the mA/kV was utilized to reduce the radiation dose to as low as reasonably achievable. COMPARISON: CT brain performed 11/30/2022. HISTORY: ORDERING SYSTEM PROVIDED HISTORY: reduced [employer] TECHNOLOGIST PROVIDED HISTORY: reduced [employer] FINDINGS: BRAIN/VENTRICLES: There is redemonstration of a left-sided subdural hemorrhage that is not significantly changed in size compared to prior examination. There is a small right-sided subdural hemorrhage that is similar in size compared to prior examination. There is scattered subarachnoid hemorrhage within the cerebral hemispheres bilaterally that is similar compared to prior examination. There is no significant mass effect or midline shift. The infratentorial structures are unremarkable. ORBITS: The visualized portion of the orbits demonstrate no acute abnormality. SINUSES: The visualized paranasal sinuses and mastoid air cells demonstrate no acute abnormality. SOFT TISSUES/SKULL:  There is similar scalp soft tissue swelling in the left parietal region.      Bilateral subdural hemorrhage with the left being larger than the right. Overall these are similar in size compared to prior examination. Subarachnoid hemorrhage within the cerebral hemispheres bilaterally that is more pronounced on the right compared to the left. Overall this is similar compared to prior examination. CT HEAD WO CONTRAST    Result Date: 11/30/2022  EXAMINATION: CT OF THE HEAD WITHOUT CONTRAST  11/30/2022 4:32 am TECHNIQUE: CT of the head was performed without the administration of intravenous contrast. Automated exposure control, iterative reconstruction, and/or weight based adjustment of the mA/kV was utilized to reduce the radiation dose to as low as reasonably achievable. COMPARISON: 11/29/2022. HISTORY: ORDERING SYSTEM PROVIDED HISTORY: sah, sdh TECHNOLOGIST PROVIDED HISTORY: sah, sdh FINDINGS: BRAIN/VENTRICLES: Again noted and unchanged is copious right hemispheric subarachnoid hemorrhage with minimal anterior to anterolateral right subdural hematoma. There also appears to be subdural hematoma extending along the right tentorial leaflet. This is minimal and although not mentioned on the report for the earlier exam, it is unchanged. Also unchanged is the more prominent left convexity subdural hematoma with maximum thickness of 13 mm. No significant midline shift. No parenchymal hemorrhage identified. Mild diffuse cerebral atrophy and chronic white matter ischemic change. ORBITS: The visualized portion of the orbits demonstrate no acute abnormality. SINUSES: The visualized paranasal sinuses and mastoid air cells demonstrate no acute abnormality. SOFT TISSUES/SKULL:  Osseous structures appear intact. Left posterolateral scalp hematoma is noted. No significant interval change since the exam approximately 7 hours earlier with fairly extensive extra-axial hemorrhage as described above.      CT HEAD WO CONTRAST    Result Date: 11/29/2022  EXAMINATION: CT OF THE HEAD WITHOUT CONTRAST  11/29/2022 9:12 pm TECHNIQUE: CT of the head was performed without the administration of intravenous contrast. Automated exposure control, iterative reconstruction, and/or weight based adjustment of the mA/kV was utilized to reduce the radiation dose to as low as reasonably achievable. COMPARISON: None. HISTORY: ORDERING SYSTEM PROVIDED HISTORY: fall TECHNOLOGIST PROVIDED HISTORY: fall Reason for Exam: trauma fall Relevant Medical/Surgical History: pt received contrast 2 hours ago, trauma attending override due to neurological changes FINDINGS: BRAIN/VENTRICLES: Copious subarachnoid hemorrhage is present at the right anterior and lateral aspects of the cerebral hemisphere most prominently involving the sylvian fissure region. There is also superimposed anterior to anterolateral right convexity minor subdural hematoma. More prominent more diffuse left convexity subdural hematoma is present with maximum thickness of approximately 13 mm as measured on coronal image 55. There is resulting 2-3 mm rightward midline shift. The ventricular system is unremarkable. There is mild diffuse cerebral atrophy and chronic white matter ischemic change. ORBITS: The visualized portion of the orbits demonstrate no acute abnormality. SINUSES: The visualized paranasal sinuses and mastoid air cells demonstrate no acute abnormality. SOFT TISSUES/SKULL:  Osseous structures appear intact. This includes the lateral aspect of the left occipital bone. There is a small to moderate localized scalp hematoma at the left posterolateral aspect. Right subarachnoid and bilateral subdural hematomas as described with minimal rightward midline shift. Mild diffuse cerebral atrophy and chronic white matter ischemic change. Left posterolateral scalp hematoma. Critical results were called by Dr. Albert Govea to Dr. Jewels Dietrich On 11/29/2022 at 22:37.      CT CERVICAL SPINE WO CONTRAST    Result Date: 11/29/2022  EXAMINATION: CT OF THE CERVICAL SPINE WITHOUT CONTRAST 11/29/2022 9:12 pm TECHNIQUE: CT of the cervical spine was performed without the administration of intravenous contrast. Multiplanar reformatted images are provided for review. Automated exposure control, iterative reconstruction, and/or weight based adjustment of the mA/kV was utilized to reduce the radiation dose to as low as reasonably achievable. COMPARISON: None. HISTORY: ORDERING SYSTEM PROVIDED HISTORY: fall TECHNOLOGIST PROVIDED HISTORY: fall Reason for Exam: trauma fall FINDINGS: Examination is mildly degraded by motion. BONES/ALIGNMENT: There is reversal the normal cervical lordosis. No significant listhesis. Vertebral body heights are maintained. No displaced fracture. DEGENERATIVE CHANGES: Multilevel degenerative changes of the cervical spine. SOFT TISSUES: There is no prevertebral soft tissue swelling. 1.2 cm right parotid nodule. 1. No acute fracture of the cervical spine. 2. Multilevel degenerative change. 3. Straightening of the normal cervical lordosis. 4. 1.2 cm right parotid lesion is indeterminate, possibly an intraparotid lymph node. Nonemergent follow-up ultrasound may be useful for further evaluation. CTA HEAD NECK W CONTRAST    Result Date: 11/30/2022  EXAMINATION: CTA OF THE HEAD AND NECK WITH CONTRAST 11/30/2022 4:32 am: TECHNIQUE: CTA of the head and neck was performed with the administration of intravenous contrast. Multiplanar reformatted images are provided for review. MIP images are provided for review. Stenosis of the internal carotid arteries measured using NASCET criteria. Automated exposure control, iterative reconstruction, and/or weight based adjustment of the mA/kV was utilized to reduce the radiation dose to as low as reasonably achievable. COMPARISON: None. HISTORY: ORDERING SYSTEM PROVIDED HISTORY: fall sdh, sah TECHNOLOGIST PROVIDED HISTORY: fall sdh, sah FINDINGS: CTA NECK: AORTIC ARCH/ARCH VESSELS: No dissection or arterial injury.   No significant stenosis of the brachiocephalic or subclavian arteries. CAROTID ARTERIES: No dissection, arterial injury, or hemodynamically significant stenosis by NASCET criteria. VERTEBRAL ARTERIES: No dissection, arterial injury, or significant stenosis. SOFT TISSUES: The lung apices are clear. No cervical or superior mediastinal lymphadenopathy. The larynx and pharynx are unremarkable. No acute abnormality of the salivary and thyroid glands. BONES: No acute osseous abnormality. CTA HEAD: ANTERIOR CIRCULATION: No significant stenosis of the intracranial internal carotid, anterior cerebral, or middle cerebral arteries. No aneurysm. The right A1 segment is diminutive. The left is prominent and there is a robust anterior communicating artery. POSTERIOR CIRCULATION: No significant stenosis of the vertebral, basilar, or posterior cerebral arteries. No aneurysm. Balanced posterior communicating artery and P1 segment are present on the left. No posterior communicating artery on the right. OTHER: No dural venous sinus thrombosis on this non-dedicated study. Left posterolateral scalp hematoma. BRAIN: Please refer to the report for the dedicated noncontrast head CT. Intracranial hemorrhage is present. Unremarkable CTA of the head and neck. Intracranial hemorrhage present. Please refer to the report for the dedicated noncontrast head CT. Left posterolateral scalp hematoma. CT CHEST ABDOMEN PELVIS W CONTRAST Additional Contrast? None    Result Date: 11/29/2022  EXAMINATION: CT OF THE CHEST, ABDOMEN, AND PELVIS WITH CONTRAST; CT OF THE THORACIC SPINE WITHOUT CONTRAST; CT OF THE LUMBAR SPINE WITHOUT CONTRAST 11/29/2022 9:13 pm TECHNIQUE: CT of the chest, abdomen and pelvis was performed with the administration of intravenous contrast. Multiplanar reformatted images are provided for review.  Automated exposure control, iterative reconstruction, and/or weight based adjustment of the mA/kV was utilized to reduce the radiation dose to as low as reasonably achievable.; CT of the thoracic spine was performed without the administration of intravenous contrast. Multiplanar reformatted images are provided for review. Automated exposure control, iterative reconstruction, and/or weight based adjustment of the mA/kV was utilized to reduce the radiation dose to as low as reasonably achievable.; CT of the lumbar spine was performed without the administration of intravenous contrast. Multiplanar reformatted images are provided for review. Adjustment of mA and/or kV according to patient size was utilized. Automated exposure control, iterative reconstruction, and/or weight based adjustment of the mA/kV was utilized to reduce the radiation dose to as low as reasonably achievable. COMPARISON: None HISTORY: ORDERING SYSTEM PROVIDED HISTORY: fall TECHNOLOGIST PROVIDED HISTORY: fall Reason for Exam: trauma fall Relevant Medical/Surgical History: exam repeated due to motion. pt is altered and won't hold still; ORDERING SYSTEM PROVIDED HISTORY: fall TECHNOLOGIST PROVIDED HISTORY: fall Reason for Exam: trauma fall FINDINGS: Examination is degraded by motion. Chest: Mediastinum: Heart is upper limits of normal in size without pericardial effusion. Thoracic aorta and main pulmonary artery are normal in caliber. No mediastinal hematoma. Lungs/pleura: There is no pleural effusion. There is no pneumothorax. There is subsegmental atelectasis in the lung bases. Soft Tissues/Bones: No displaced fracture within the limitations of motion. Abdomen/Pelvis: Organs: The liver is diffusely hypoattenuating. Prior cholecystectomy. No acute abnormality within the spleen, pancreas, or adrenal glands. No hydronephrosis. Bilateral renal cysts measure up to 2.6 cm on the right. No further imaging follow-up is required. Subcentimeter hypoattenuating hepatic lesions are too small to accurately characterize, likely cysts or hemangiomas. GI/Bowel: There is a small hiatal hernia.   The stomach is partially distended. The small bowel is nondilated. The colon is nondilated. There are scattered, noninflamed diverticula. Pelvis: Bladder is partially distended with layering contrast. Peritoneum/Retroperitoneum: No ascites or pneumoperitoneum. Abdominal aorta is normal in caliber with scattered atherosclerosis. Bones/Soft Tissues: No acute osseous abnormality. There is edema/contusion in the left upper thigh soft tissues. THORACIC/LUMBAR SPINES Mild dextrocurvature in the lower thoracic spine. No significant listhesis. Mild kyphosis. Vertebral body heights are maintained. No displaced fracture within the limitations of motion. There is levocurvature of the lumbar spine. There are 5 lumbar vertebral bodies. There is anterolisthesis at L3-4, L4-5, and L5-S1. Vertebral body heights are maintained. No displaced fracture. There are multilevel degenerative changes of the lumbar spine. 1. No acute intrathoracic abnormality. 2. No acute abdominal abnormality. 3. No acute abnormality in the thoracic or lumbar spines. 4. Edema/contusion in the left upper thigh soft tissues. 5. Hepatic steatosis. 6. Diverticulosis without diverticulitis. CT LUMBAR SPINE TRAUMA RECONSTRUCTION    Result Date: 11/29/2022  EXAMINATION: CT OF THE CHEST, ABDOMEN, AND PELVIS WITH CONTRAST; CT OF THE THORACIC SPINE WITHOUT CONTRAST; CT OF THE LUMBAR SPINE WITHOUT CONTRAST 11/29/2022 9:13 pm TECHNIQUE: CT of the chest, abdomen and pelvis was performed with the administration of intravenous contrast. Multiplanar reformatted images are provided for review. Automated exposure control, iterative reconstruction, and/or weight based adjustment of the mA/kV was utilized to reduce the radiation dose to as low as reasonably achievable.; CT of the thoracic spine was performed without the administration of intravenous contrast. Multiplanar reformatted images are provided for review.  Automated exposure control, iterative reconstruction, and/or weight based adjustment of the mA/kV was utilized to reduce the radiation dose to as low as reasonably achievable.; CT of the lumbar spine was performed without the administration of intravenous contrast. Multiplanar reformatted images are provided for review. Adjustment of mA and/or kV according to patient size was utilized. Automated exposure control, iterative reconstruction, and/or weight based adjustment of the mA/kV was utilized to reduce the radiation dose to as low as reasonably achievable. COMPARISON: None HISTORY: ORDERING SYSTEM PROVIDED HISTORY: fall TECHNOLOGIST PROVIDED HISTORY: fall Reason for Exam: trauma fall Relevant Medical/Surgical History: exam repeated due to motion. pt is altered and won't hold still; ORDERING SYSTEM PROVIDED HISTORY: fall TECHNOLOGIST PROVIDED HISTORY: fall Reason for Exam: trauma fall FINDINGS: Examination is degraded by motion. Chest: Mediastinum: Heart is upper limits of normal in size without pericardial effusion. Thoracic aorta and main pulmonary artery are normal in caliber. No mediastinal hematoma. Lungs/pleura: There is no pleural effusion. There is no pneumothorax. There is subsegmental atelectasis in the lung bases. Soft Tissues/Bones: No displaced fracture within the limitations of motion. Abdomen/Pelvis: Organs: The liver is diffusely hypoattenuating. Prior cholecystectomy. No acute abnormality within the spleen, pancreas, or adrenal glands. No hydronephrosis. Bilateral renal cysts measure up to 2.6 cm on the right. No further imaging follow-up is required. Subcentimeter hypoattenuating hepatic lesions are too small to accurately characterize, likely cysts or hemangiomas. GI/Bowel: There is a small hiatal hernia. The stomach is partially distended. The small bowel is nondilated. The colon is nondilated. There are scattered, noninflamed diverticula.  Pelvis: Bladder is partially distended with layering contrast. Peritoneum/Retroperitoneum: No ascites or pneumoperitoneum. Abdominal aorta is normal in caliber with scattered atherosclerosis. Bones/Soft Tissues: No acute osseous abnormality. There is edema/contusion in the left upper thigh soft tissues. THORACIC/LUMBAR SPINES Mild dextrocurvature in the lower thoracic spine. No significant listhesis. Mild kyphosis. Vertebral body heights are maintained. No displaced fracture within the limitations of motion. There is levocurvature of the lumbar spine. There are 5 lumbar vertebral bodies. There is anterolisthesis at L3-4, L4-5, and L5-S1. Vertebral body heights are maintained. No displaced fracture. There are multilevel degenerative changes of the lumbar spine. 1. No acute intrathoracic abnormality. 2. No acute abdominal abnormality. 3. No acute abnormality in the thoracic or lumbar spines. 4. Edema/contusion in the left upper thigh soft tissues. 5. Hepatic steatosis. 6. Diverticulosis without diverticulitis. CT THORACIC SPINE TRAUMA RECONSTRUCTION    Result Date: 11/29/2022  EXAMINATION: CT OF THE CHEST, ABDOMEN, AND PELVIS WITH CONTRAST; CT OF THE THORACIC SPINE WITHOUT CONTRAST; CT OF THE LUMBAR SPINE WITHOUT CONTRAST 11/29/2022 9:13 pm TECHNIQUE: CT of the chest, abdomen and pelvis was performed with the administration of intravenous contrast. Multiplanar reformatted images are provided for review. Automated exposure control, iterative reconstruction, and/or weight based adjustment of the mA/kV was utilized to reduce the radiation dose to as low as reasonably achievable.; CT of the thoracic spine was performed without the administration of intravenous contrast. Multiplanar reformatted images are provided for review.  Automated exposure control, iterative reconstruction, and/or weight based adjustment of the mA/kV was utilized to reduce the radiation dose to as low as reasonably achievable.; CT of the lumbar spine was performed without the administration of intravenous contrast. Multiplanar reformatted images are provided for review. Adjustment of mA and/or kV according to patient size was utilized. Automated exposure control, iterative reconstruction, and/or weight based adjustment of the mA/kV was utilized to reduce the radiation dose to as low as reasonably achievable. COMPARISON: None HISTORY: ORDERING SYSTEM PROVIDED HISTORY: fall TECHNOLOGIST PROVIDED HISTORY: fall Reason for Exam: trauma fall Relevant Medical/Surgical History: exam repeated due to motion. pt is altered and won't hold still; ORDERING SYSTEM PROVIDED HISTORY: fall TECHNOLOGIST PROVIDED HISTORY: fall Reason for Exam: trauma fall FINDINGS: Examination is degraded by motion. Chest: Mediastinum: Heart is upper limits of normal in size without pericardial effusion. Thoracic aorta and main pulmonary artery are normal in caliber. No mediastinal hematoma. Lungs/pleura: There is no pleural effusion. There is no pneumothorax. There is subsegmental atelectasis in the lung bases. Soft Tissues/Bones: No displaced fracture within the limitations of motion. Abdomen/Pelvis: Organs: The liver is diffusely hypoattenuating. Prior cholecystectomy. No acute abnormality within the spleen, pancreas, or adrenal glands. No hydronephrosis. Bilateral renal cysts measure up to 2.6 cm on the right. No further imaging follow-up is required. Subcentimeter hypoattenuating hepatic lesions are too small to accurately characterize, likely cysts or hemangiomas. GI/Bowel: There is a small hiatal hernia. The stomach is partially distended. The small bowel is nondilated. The colon is nondilated. There are scattered, noninflamed diverticula. Pelvis: Bladder is partially distended with layering contrast. Peritoneum/Retroperitoneum: No ascites or pneumoperitoneum. Abdominal aorta is normal in caliber with scattered atherosclerosis. Bones/Soft Tissues: No acute osseous abnormality.   There is edema/contusion in the left

## 2022-12-03 NOTE — PROGRESS NOTES
Physical Therapy  Facility/Department: [facility] CAR 1- SICU  Physical Therapy Initial Assessment    Name: Aniya Capone  : 1940  MRN: [id number]  Date of Service: 12/3/2022    Discharge Recommendations:  Patient would benefit from continued therapy after discharge Pt is currently functional below baseline and recommend comprehensive and intensive skilled therapy by a multidisciplinary team. Would expect patient to be able to tolerate 3 hours of therapy per day and able to tolerate at least one hour up in chair. Please refer to AM-PAC score for current mobility/adl level. HPI: Aniya Capone is a 78 y.o. female that presented to the Emergency Department following fall down an unknown number of steps at [facility] earlier today. Patient was seen and evaluated at [facility] where she was found to have SDH and SAH. Patient was transferred to [place] [id number] for further evaluation. Upon arrival, patient is GCS 14 d/t confusion. States she has a HA and repeatedly attempts to get out of bed. Patient became nauseous and started vomiting. History limited secondary to patient's mental status  : pt transferred from [facility] after an unwitnessed fall, pt a&ox1 with no memory of event (either at [facility] or home-conflicting stories) CT showed sdh with diffuse sah and mls. - tx to car , [facility] stable -concerns for avm due to scans appearance vs bleed from fall   - awaiting family decision to do angios and if so results will determine if placing a corpak due to poor nutrition   -exploratory angiogram, went thru R groin-no hematoma, no bldg at site, pulses are good, pressure drsg removed. No interventions in IR. k replaced. Patient Diagnosis(es): The encounter diagnosis was Subdural hematoma. Past Medical History:  has a past medical history of Non-smoker. Past Surgical History:  has no past surgical history on file.     Assessment   Body Structures, Functions, Activity Limitations Requiring Skilled Therapeutic Intervention: Decreased functional mobility ; Decreased tolerance to work activity; Decreased endurance;Decreased balance;Decreased posture  Assessment: Pt presents this date w/ decreased consciousness but able to wake up enough to mobilize and has asked for bed pan a couple of times early this date. Pt is agreeable to mobility and motivated. Pt requiring mod verbal cueing to sustain keeping her awake and tactile cueing for r.walker placement during gait but was able to progress to r.walker this date. Pt comfortable up in chair post therapy and RN ok with it. Discussed safety w/ patient height and needing either a boost to return to bed or to use the damien stedy to add a few inches. Would expect continued improvement with mobility. Will continue to follow while hospitalized and recommend further therapy at time of d/c.   Specific Instructions for Next Treatment: mobility  Therapy Prognosis: Good  Decision Making: Medium Complexity  Clinical Presentation: evolvig  Activity Tolerance  Activity Tolerance: Treatment limited secondary to decreased cognition     Plan   Physcial Therapy Plan  General Plan:  (5-6x/week)  Specific Instructions for Next Treatment: mobility  Current Treatment Recommendations: Strengthening, Balance training, Functional mobility training, Transfer training, Endurance training, Patient/Caregiver education & training, Safety education & training, Home exercise program, Gait training, Stair training, Therapeutic activities, Positioning, Cognitive reorientation  Safety Devices  Type of Devices: Patient at risk for falls, Bed alarm in place, Gait belt, Nurse notified, Left in chair  Restraints  Restraints Initially in Place: No     Restrictions  Restrictions/Precautions  Restrictions/Precautions: Fall Risk, General Precautions  Required Braces or Orthoses?: No  Position Activity Restriction  Other position/activity restrictions: up with A, DNR-CCA, c-spine clear; OK for OOB ; verbal order from MD Shay matson for OOB; RN ok'd OOB. Subjective   General  Chart Reviewed: Yes  Patient assessed for rehabilitation services?: Yes  Response To Previous Treatment: Not applicable  Family / Caregiver Present: Yes (daughter present initially and then left after 5 minutes.)  Follows Commands: Within Functional Limits (somnolent; opens eyes to command)  Subjective  Subjective: Pt lethargic this am but able to stay awake for mobility and while upright. Pt would fall back asleep once back in chair or bed. Pt sweet; daughter is supportive. Reports patient has raised many children including her sisters. Social/Functional History  Social/Functional History  Lives With: Alone  Type of Home: Mobile home  Home Layout: One level  Home Access: Stairs to enter with rails  Entrance Stairs - Number of Steps: 6  Entrance Stairs - Rails: Both  Bathroom Shower/Tub: Tub/Shower unit  Bathroom Toilet: Standard  ADL Assistance: Independent  Homemaking Assistance: Independent  Homemaking Responsibilities: Yes  Ambulation Assistance: Independent  Transfer Assistance: Independent  Active : Yes  Mode of Transportation: Car  Occupation: Retired  Leisure & Hobbies: Restoration  Additional Comments: Dtr/son check on pt daily, dtr Sheree Barron states pt can return home with pt at d/c if necessary  [address]: Impaired  Vision Exceptions: Wears glasses at all times  Hearing  Hearing: Within functional limits    Cognition   Cognition  Overall Cognitive Status: WFL  Cognition Comment: extremely lethargic today. Objective   Heart Rate: 58  Heart Rate Source: Monitor  BP: (!) 153/63  BP Location: Right Arm  BP Method: Automatic  Patient Position: Semi fowlers  MAP (Calculated): 93  Resp: 15  SpO2: 97 %  O2 Device: None (Room air)     Observation/Palpation  Observation: 4'9\" slight lean to the right with sitting today.       AROM RLE (degrees)  RLE AROM: WFL  AROM LLE (degrees)  LLE AROM : WFL  AROM RUE (degrees)  RUE AROM : WFL  AROM LUE (degrees)  LUE AROM : WFL  Strength RLE  Comment: 4/5 grossly tested  Strength LLE  Comment: 4/5 grossly tested  Strength RUE  Strength RUE: WFL  Strength LUE  Strength LUE: WFL     Bed mobility  Rolling to Left: Minimal assistance  Rolling to Right: Minimal assistance  Supine to Sit: Minimal assistance  Scooting: Moderate assistance  Transfers  Sit to Stand: Minimal Assistance  Stand to Sit: Minimal Assistance  Bed to Chair: Minimal assistance  Ambulation  Surface: Level tile  Device: Rolling Walker  Assistance: Minimal assistance  Quality of Gait: Decreased step length eliazar and shuffle steps only; pt would run r.walker into objects; manual cueing provided. Gait Deviations: Slow Carm[place]; Shuffles;Decreased step height;Decreased step length  Distance: 5 ft  Comments: initially worked in [employer] w/ static standing; good control; worked with weight shifting and progressed to marching. Pt able to sustain midline and offload eliazar LE's -> progressed to r.walker with good results. Pt will need a foot stool or a boost to get back into bed after up in chair - RN informed. Gait training with external auditory cueing, combined with attentional strategy to increase step length, step height, base of support. Balance  Posture: Fair (fwd head;)  Sitting - Static: Good  Sitting - Dynamic: Good;-  Standing - Static: Fair;+  Standing - Dynamic: Fair     AM-PAC Score: 16/24     Goals  Short Term Goals  Time Frame for Short Term Goals: 12 tx's  Short Term Goal 1: Bed mob independent  Short Term Goal 2: Transfers independent  Short Term Goal 3: Amb x 50 ft w/ r.walker independently  Short Term Goal 4: Pt issued HEP and independent with exercises. Patient Goals   Patient Goals : Pt goal is to get her strength back and return home.      Education  Patient Education  Education Given To: Patient  Education Provided: Role of Therapy;Orientation  Education Method: Demonstration;Verbal  Barriers to Learning: Cognition  Education Outcome: Verbalized understanding;Continued education needed    Therapy Time   Individual   Time In 1204   Time Out 1250   Minutes 46      Total time on patient care 55 minutes. Treatment time 30 minutes.      Gertrudis Valdez, PT

## 2022-12-03 NOTE — PLAN OF CARE
Problem: Pain  Goal: Verbalizes/displays adequate comfort level or baseline comfort level  Outcome: Progressing     Problem: Safety - Adult  Goal: Free from fall injury  Outcome: Progressing     Problem: Skin/Tissue Integrity  Goal: Absence of new skin breakdown  Description: 1. Monitor for areas of redness and/or skin breakdown  2. Assess vascular access sites hourly  3. Every 4-6 hours minimum:  Change oxygen saturation probe site  4. Every 4-6 hours:  If on nasal continuous positive airway pressure, respiratory therapy assess nares and determine need for appliance change or resting period.   Outcome: Progressing     Problem: Chronic Conditions and Co-morbidities  Goal: Patient's chronic conditions and co-morbidity symptoms are monitored and maintained or improved  Outcome: Progressing  Flowsheets (Taken 12/2/2022 2000)  Care Plan - Patient's Chronic Conditions and Co-Morbidity Symptoms are Monitored and Maintained or Improved: Monitor and assess patient's chronic conditions and comorbid symptoms for stability, deterioration, or improvement     Problem: Discharge Planning  Goal: Discharge to home or other facility with appropriate resources  Recent Flowsheet Documentation  Taken 12/2/2022 2000 by Rebecca Wang RN  Discharge to home or other facility with appropriate resources: Identify barriers to discharge with patient and caregiver

## 2022-12-03 NOTE — PROGRESS NOTES
Endovascular Neurosurgery Progress Note    SUBJECTIVE:     No acute event, no overnight event, patient is awake today and cheerful. Sitting on the chair        Review of Systems:  CONSTITUTIONAL:  negative for fevers, chills, fatigue and malaise    EYES:  negative for double vision, blurred vision and photophobia     HEENT:  negative for tinnitus, epistaxis and sore throat    RESPIRATORY:  negative for cough, shortness of breath, wheezing    CARDIOVASCULAR:  negative for chest pain, palpitations, syncope, edema    GASTROINTESTINAL:  negative for nausea, vomiting    GENITOURINARY:  negative for incontinence    MUSCULOSKELETAL:  negative for neck or back pain    NEUROLOGICAL:  Negative for weakness and tingling  negative for headaches and dizziness    PSYCHIATRIC:  negative for anxiety      Review of systems otherwise negative. OBJECTIVE:     Vitals:    12/03/22 0700   BP: (!) 141/64   Pulse: 56   Resp: 14   Temp:    SpO2:         General:  Gen: normal habitus, NAD  HEENT: NCAT, mucosa moist  Cvs: RRR, S1 S2 normal  Resp: symmetric unlabored breathing  Abd: s/nd/nt  Ext: no edema  Skin: no lesions seen, warm and dry  Right groin: clean, no bruises, distal pulses are good    Neuro:  Gen: awake and alert, oriented x3. Lang/speech: no aphasia or dysarthria. Follows commands. CN: PERRL, EOMI, VFF, V1-3 intact, face symmetric, hearing intact, shoulder shrug symmetric, tongue midline  Motor: grossly 5/5 UE and LE b/l  Sense: LT intact in all 4 ext. Coord: FTN and HTS intact b/l  DTR: deferred  Gait: deferred    NIH Stroke Scale:   1a  Level of consciousness: 0 - alert; keenly responsive   1b. LOC questions:  0 - answers both questions correctly   1c. LOC commands: 0 - performs both tasks correctly   2. Best Gaze: 0 - normal   3. Visual: 0 - no visual loss   4. Facial Palsy: 0 - normal symmetric movement   5a. Motor left arm: 0 - no drift, limb holds 90 (or 45) degrees for full 10 seconds   5b.   Motor right arm: 0 - no drift, limb holds 90 (or 45) degrees for full 10 seconds   6a. Motor left le - no drift; leg holds 30 degree position for full 5 seconds   6b  Motor right le - no drift; leg holds 30 degree position for full 5 seconds   7. Limb Ataxia: 0 - absent   8. Sensory: 0 - normal; no sensory loss   9. Best Language:  0 - no aphasia, normal   10. Dysarthria: 0 - normal   11. Extinction and Inattention: 0 - no abnormality         Total:   0     MRS: 3      LABS:   Reviewed. Lab Results   Component Value Date    HGB 10.2 (L) 2022    WBC 9.1 2022     2022     (L) 2022    BUN 9 2022    CREATININE 0.47 (L) 2022    MG 2.0 2022    APTT 25.0 2022    INR 1.0 2022      No results found for: COVID19    RADIOLOGY:   Images were personally reviewed including:    CT head done on the 22        Cerebral angiogram 12/3/22:    No aneurysm, no AVM, no DAVF, or abnormal vessels seen  This is otherwise an unremarkable cerebral angiogram    ASSESSMENT:     78year old woman who lives by herself, with medical hx of HTN, fell down a flight of stair on the 22 and found to have unconscious. Patient does not remember who long she was down, but recalled that she had a headache before the fall. CT head showed acute B SDH and diffuse thick SAH, endovascular was consulted to do a DSA to r/o aneurysm. CTA head is negative so far. DSA is also negative. PLAN:     - negative DSA    - this is either a traumatic SAH or perimesencephalic SAH    - pt can be on chemo DVT prophylaxis from endovascular standpoint, however given her acute SDH, recommend to seek clearance from NSGY    - f/u with me in 2-4 weeks in the clinic      Case discussed with Dr. Sarabia Finely attending.     Keyshawn Eduardo MD PGY 7  Stroke, [facility]  [address] [facility] [address]  Electronically signed 12/3/2022 at 8:27 AM

## 2022-12-03 NOTE — BRIEF OP NOTE
[facility]    NEUROENDOVASCULAR SERVICE: POST-OP NOTE: 12/2/2022    Pt Name: Paulo Pichardo  MRN: [id number]  YOB: 1940  Date of Procedure: 12/2/2022  Primary Care Physician: Tonja Davidson MD  Referring Physician:Dr Delia Munoz MD      Pre-Procedural Diagnosis:[facility] with SDH  Post-Procedural Diagnosis: Diffuse SAH with SDH      Procedure Performed:Diagnostic Cerebral Angiogram    Surgeon:   Yobani Ramos MD    Fellow:  Shannan Chapa MD     Assisting Tech:  Ruben Parker    PRE-PROCEDURAL EXAM:  Prestroke baseline mRS MODIFIED SABA SCORE: 0 - No symptoms at all. Neurological exam performed and unchanged from initial H&P or consult      Anesthesia: IV Moderate Sedation  Complications: none    Intra-Operative EXAM:  Neurological exam performed and unchanged from initial H&P or consult    EBL: < Minimal      Cc            Specimens: Were not Obtained  Contrast:     Visipaque 320 low osmolar 78 Cc             Fluoro: 24.1 min    Findings:  Please see dictated Radiology note for further details  No aneurysm, no AVM, no DAVF, or abnormal vessels seen  This is otherwise an unremarkable cerebral angiogram        POST-PROCEDURAL EXAM :   Stable neurological Exam  Neurological exam performed and unchanged from initial H&P or consult    Closure:  right Vascade 5   F        POST-PROCEDURAL MONITORING : see orders  Disposition:  Trauma ICU      Recommendations:  Back to  trauma ICU  Do not bend right leg for 3 hours. Groin checks per protocol. Peripheral pulse checks per protocol. SBP goal 100-140  Follow up with Shannan Chapa MD  2 weeks after discharge and Dr. Pastor Morillo 3 months after discharge.         MD Yobani Keith MD   Pager [phone]  Stroke, [facility]  [id number] [address] [place]  Electronically signed 12/2/2022 at 8:05 PM

## 2022-12-03 NOTE — PLAN OF CARE
PT saw pt. Today; 2x assist with Lauren Alexandre; upper extremities strong; lowers moderate. Care plan goals progressing.

## 2022-12-03 NOTE — PROGRESS NOTES
ICU PROGRESS NOTE  PATIENT NAME: Zainab Pryor RECORD NO. [id number]  DATE: 12/3/2022    PRIMARY CARE PHYSICIAN: Juanpablo Perrin MD    HD: # 4    ASSESSMENT    Patient Active Problem List   Diagnosis    Fall at home, initial encounter    Subdural hematoma    Traumatic subarachnoid hemorrhage with loss of consciousness of 30 minutes or less ([facility] [address]    Encounter for palliative care    SAH (subarachnoid hemorrhage) [facility])       MEDICAL DECISION MAKING AND PLAN  Neuro:  Injuries: Right SAH and bilateral SDH with minimal rightward midline shift   GCS 14-15  Neuro-endovascular  Diagnostic angio done yesterday. Unremarkable exam without aneurysm, AVM, DAVF or abnormal vessels seen. F/u outpatient with Dr. Carlos Keating in 2 weeks and Dr. Anahi Mukherjee in 3 months  [id number] [address] with DVT prophylaxis  Neurosurgery  No operative intervention indicated  Keppra 500 BID  Inderal x 28 days  Pain control: Tylenol  CV/Heme  HR 48-73  -160  Home meds: held lisinopril, hydrochlorothiazide  Not on levo or vaso  H/H: 10.2/29.9 (10.4/31. 6)  Pulm  Room air  Continue to encourage incentive spirometer  GI/Nutrition  Diet: Regular diet with high protein supplements  Will need to discuss enteral nutrition access as pt has not had adequate nutrition and intake during her stay. Will plan on discussing NG tube with family today  Bowel regimen: Glycolax, Senna  Renal/lytes  Bun/Cr: 9/0.47 (9/0.44)  Na/K 133/3.4 (130/3.4), will replace  NS 50 mL/hr   UOP: voiding spontaneously but not able to make it in the hat for measurement  7. Endocrine        1. -106  Musculoskeletal  TERT complete  Skin- pt had skin irritation under telemetry patches, benadryl ointment ordered with improvement.   Family/dispo  Family at bedside yesterday, understanding pt may need tube feeds   Lines  Peripheral IV      CHECKLIST    CAM-ICU RASS: +1  RESTRAINTS: Not required  IVF: 50 NS for NPO  NUTRITION: regular diet with high protein supplements  ANTIBIOTICS: Not indicated  GI: Reg  DVT: Start Lovenox  GLYCEMIC CONTROL: glucose within normal limits  HOB >45: if comfortable  MOBILITY: with assistance  SBT: Not applicable  IS: Pt confused by how IS works, needs bedside nursing assistance    Chief Complaint: \"Feeling good\"    SUBJECTIVE    Pierce Bang  had no acute overnight events. She was taken for diagnostic angiogram yesterday which did not demonstrate any abnormalities. Pt had some telemetry patch irritation and was itching throughout the night. Benadryl ointment seemed to improve her symptoms. OBJECTIVE  VITALS: Temp: Temp: (P) 99.5 °F (37.5 °C)Temp  Av.5 °F (37.5 °C)  Min: 99 °F (37.2 °C)  Max: 99.9 °F (34.3 °C) BP Systolic (56LHB), EQX:261 , Min:124 , YKY:477   Diastolic (08DZV), EBT:99, Min:45, Max:121   Pulse Pulse  Av.2  Min: 48  Max: 73 Resp Resp  Av.2  Min: 11  Max: 25 Pulse ox SpO2  Av.6 %  Min: 94 %  Max: 100 %    GENERAL: sleepy, will smile and answer questions appropriately  NEURO: GCS 14-15,   HEENT: normocephalic atraumatic  LUNGS: clear to auscultation bilaterally, no increased work of breathing  HEART: normal rate and regular rhythm  ABDOMEN: soft, non-tender, non-distended  EXTREMITY: no cyanosis, clubbing or edema    LAB:  CBC:   Recent Labs     22  0850 22  0735   WBC 11.4* 10.9   HGB 11.2* 10.4*   HCT 33.4* 31.6*   MCV 94.4 97.2    191       BMP:   Recent Labs     22  0501 22  0850 22  0735   * 131* 130*   K 3.7 3.4* 3.4*   CL 94* 96* 100   CO2 20 23 18*   BUN 10 9 9   CREATININE 0.70 0.54 0.44*   GLUCOSE 126* 106* 100*         Mark Sharif DO PGY-2  12/3/22 4:07 AM          Trauma Attending Attestation      I have reviewed the above GCS note(s) and confirmed the key elements of the medical history and physical exam. I have seen and examined the pt. I have discussed the findings, established the care plan and recommendations with Resident.   DSA negative for aneurysm   PT/OT   Ensure/boost   Possible transfer to neuro step down         Marlene Caller,   12/3/2022  4:13 PM

## 2022-12-04 NOTE — PLAN OF CARE
Problem: Discharge Planning  Goal: Discharge to home or other facility with appropriate resources  12/4/2022 0314 by Sanjeev Fountain RN  Outcome: Progressing  12/3/2022 1457 by Britta Hill RN  Outcome: Progressing     Problem: Pain  Goal: Verbalizes/displays adequate comfort level or baseline comfort level  12/4/2022 0314 by Sanjeev Fountain RN  Outcome: Progressing  12/3/2022 1457 by Britta Hill RN  Outcome: Progressing     Problem: Safety - Adult  Goal: Free from fall injury  12/4/2022 0314 by Sanjeev Fountain RN  Outcome: Progressing  12/3/2022 1457 by Britta Hill RN  Outcome: Progressing     Problem: ABCDS Injury Assessment  Goal: Absence of physical injury  12/4/2022 0314 by Sanjeev Fountain RN  Outcome: Progressing  12/3/2022 1457 by Britta Hill RN  Outcome: Progressing     Problem: Skin/Tissue Integrity  Goal: Absence of new skin breakdown  Description: 1. Monitor for areas of redness and/or skin breakdown  2. Assess vascular access sites hourly  3. Every 4-6 hours minimum:  Change oxygen saturation probe site  4. Every 4-6 hours:  If on nasal continuous positive airway pressure, respiratory therapy assess nares and determine need for appliance change or resting period.   12/4/2022 0314 by Sanjeev Fountain RN  Outcome: Progressing  12/3/2022 1457 by Britta Hill RN  Outcome: Progressing     Problem: Chronic Conditions and Co-morbidities  Goal: Patient's chronic conditions and co-morbidity symptoms are monitored and maintained or improved  12/4/2022 0314 by Sanjeev Fountain RN  Outcome: Progressing  12/3/2022 1457 by Britta Hill RN  Outcome: Progressing

## 2022-12-04 NOTE — PROGRESS NOTES
ICU PROGRESS NOTE  PATIENT NAME: Zainab Pryor RECORD NO. [id number]  DATE: 2022    PRIMARY CARE PHYSICIAN: Jose Roberto Robles MD    HD: # 5    ASSESSMENT    Patient Active Problem List   Diagnosis    Fall at home, initial encounter    Subdural hematoma    Traumatic subarachnoid hemorrhage with loss of consciousness of 30 minutes or less ([facility] [address]    Encounter for palliative care    SAH (subarachnoid hemorrhage) [facility])       MEDICAL DECISION MAKING AND PLAN  Neuro:  Injuries: Right SAH and bilateral SDH with minimal rightward midline shift   GCS 14-15  Neuro-endovascular  Diagnostic angio . Unremarkable exam without aneurysm, AVM, DAVF or abnormal vessels seen. F/u outpatient with Dr. Charli Zapata in 2 weeks and Dr. Grzegorz Waller in 3 months  Aditya Nunez with DVT prophylaxis  Neurosurgery  No operative intervention indicated  Keppra 500 BID  Inderal x 28 days  Pain control: Tylenol  CV/Heme  Home meds: held lisinopril, hydrochlorothiazide  H/H: 10.2/29.9 (10.4/31. 6)  Pulm  Room air  Continue to encourage incentive spirometer  GI/Nutrition  Diet: Regular diet with high protein supplements  Will need to discuss enteral nutrition access as pt has not had adequate nutrition and intake during her stay. Will plan on discussing NG tube with family today  Bowel regimen: Glycolax, Senna  Renal/lytes  NS 50 mL/hr   UOP: voiding spontaneously  7. Endocrine        1. BG wnl  Musculoskeletal  TERT complete  Skin- pt had skin irritation under telemetry patches, benadryl ointment ordered with improvement. Dispo: PM&R consulted. Chief Complaint: \"Feeling good\"    SUBJECTIVE    Patient seen and chart reviewed. No acute events overnight. Afebrile, normotensive, normal sinus rhythm, and saturating >95% on RA. Up and eating breakfast this AM. Voiding with good uop. Pain controlled.     OBJECTIVE  VITALS: Temp: Temp: 98.5 °F (36.9 °C)Temp  Av.4 °F (36.9 °C)  Min: 98.1 °F (36.7 °C)  Max: 98.8 °F (76.7 °C) BP Systolic (27GXI), RQS:892 , Min:113 , KRF:095   Diastolic (21XEE), BBZ:91, Min:47, Max:85   Pulse Pulse  Av.9  Min: 48  Max: 71 Resp Resp  Av.2  Min: 10  Max: 29 Pulse ox SpO2  Av.7 %  Min: 92 %  Max: 100 %    GENERAL: Awake and alert  NEURO: A&Ox3  HEENT: normocephalic atraumatic, C collar in place  LUNGS: equal chest rise bilaterally, no increased work of breathing  HEART: normal rate and regular rhythm  ABDOMEN: soft, non-tender, non-distended  EXTREMITY: no cyanosis, clubbing or edema    LAB:  CBC:   Recent Labs     22  0850 22  0735 22  0448   WBC 11.4* 10.9 9.1   HGB 11.2* 10.4* 10.2*   HCT 33.4* 31.6* 29.9*   MCV 94.4 97.2 92.9    191 188     BMP:   Recent Labs     22  0850 22  0735 22  0448   * 130* 133*   K 3.4* 3.4* 3.4*   CL 96* 100 101   CO2 23 18* 21   BUN 9 9 9   CREATININE 0.54 0.44* 0.47*   GLUCOSE 106* 100* 92       Alexandra Howard MD  General Surgery PGY5  Available via [facility]        Attending Note    Discharge planning  I have reviewed the above TECSS note(s) and I either performed the key elements of the medical history and physical exam or was present with the resident when the key elements of the medical history and physical exam were performed. I have discussed the findings, established the care plan and recommendations with patient's daughter and son in law and Residents.     Scarlet Meyer MD  2022  11:42 AM

## 2022-12-04 NOTE — PROGRESS NOTES
Endovascular Neurosurgery Progress Note    SUBJECTIVE:     No acute event, no overnight event. Daughter at side, daughter stated that she reviewed the Jehovah's witness's video where pt fell, patient suddenly fell down, there was no tripping. Review of Systems:  CONSTITUTIONAL:  negative for fevers, chills, fatigue and malaise    EYES:  negative for double vision, blurred vision and photophobia     HEENT:  negative for tinnitus, epistaxis and sore throat    RESPIRATORY:  negative for cough, shortness of breath, wheezing    CARDIOVASCULAR:  negative for chest pain, palpitations, syncope, edema    GASTROINTESTINAL:  negative for nausea, vomiting    GENITOURINARY:  negative for incontinence    MUSCULOSKELETAL:  negative for neck or back pain    NEUROLOGICAL:  Negative for weakness and tingling  negative for headaches and dizziness    PSYCHIATRIC:  negative for anxiety      Review of systems otherwise negative. OBJECTIVE:     Vitals:    12/04/22 1200   BP: (!) 155/67   Pulse: 70   Resp: 24   Temp: 97.9 °F (36.6 °C)   SpO2: 93%        General:  Gen: normal habitus, NAD  HEENT: NCAT, mucosa moist  Cvs: RRR, S1 S2 normal  Resp: symmetric unlabored breathing  Abd: s/nd/nt  Ext: no edema  Skin: no lesions seen, warm and dry  Right groin: clean, no bruises, distal pulses are good    Neuro:  Gen: awake and alert, oriented x3. Lang/speech: no aphasia or dysarthria. Follows commands. CN: PERRL, EOMI, VFF, V1-3 intact, face symmetric, hearing intact, shoulder shrug symmetric, tongue midline  Motor: grossly 5/5 UE and LE b/l  Sense: LT intact in all 4 ext. Coord: FTN and HTS intact b/l  DTR: deferred  Gait: deferred    NIH Stroke Scale:   1a  Level of consciousness: 0 - alert; keenly responsive   1b. LOC questions:  0 - answers both questions correctly   1c. LOC commands: 0 - performs both tasks correctly   2. Best Gaze: 0 - normal   3. Visual: 0 - no visual loss   4. Facial Palsy: 0 - normal symmetric movement   5a. Motor left arm: 0 - no drift, limb holds 90 (or 45) degrees for full 10 seconds   5b. Motor right arm: 0 - no drift, limb holds 90 (or 45) degrees for full 10 seconds   6a. Motor left le - no drift; leg holds 30 degree position for full 5 seconds   6b  Motor right le - no drift; leg holds 30 degree position for full 5 seconds   7. Limb Ataxia: 0 - absent   8. Sensory: 0 - normal; no sensory loss   9. Best Language:  0 - no aphasia, normal   10. Dysarthria: 0 - normal   11. Extinction and Inattention: 0 - no abnormality         Total:   0     MRS: 3      LABS:   Reviewed. Lab Results   Component Value Date    HGB 9.9 (L) 2022    WBC 10.3 2022     2022     (L) 2022    BUN 7 (L) 2022    CREATININE 0.49 (L) 2022    MG 2.0 2022    APTT 25.0 2022    INR 1.0 2022      No results found for: COVID19    RADIOLOGY:   Images were personally reviewed including:    CT head done on the 22        Cerebral angiogram 12/3/22:    No aneurysm, no AVM, no DAVF, or abnormal vessels seen  This is otherwise an unremarkable cerebral angiogram    ASSESSMENT:     78year old woman who lives by herself, with medical hx of HTN, fell down a flight of stair on the 22 and found to have unconscious. Patient does not remember who long she was down, but recalled that she had a headache before the fall. CT head showed acute B SDH and diffuse thick SAH, endovascular was consulted to do a DSA to r/o aneurysm. CTA head is negative so far. DSA is also negative. PLAN:     - negative DSA    - this is either a traumatic SAH or perimesencephalic SAH, both of which will resolve on its own with time    - pt can be discharged from endovascular standpoint    - f/u with me in 2-4 weeks in the clinic    - family updated          Case discussed with Dr. Ortiz Noe attending.     Deborah Gomes MD PGY 7  Stroke, [facility] Stroke [id number] [address] [place]  Electronically signed 12/4/2022 at 1:31 PM

## 2022-12-04 NOTE — PROGRESS NOTES
Assessment: Patient was awake when  visited. Family was present and receptive to spiritual care. When asked how she was feeling, patient responded; \"fine. \" Patient was raised [employer]orationism and a member of [facility] [facility]. Patient received communion. Patient was anointed on December 2. Intervention:  maintained listening presence, offered support and prayed with patient and family. Outcome: Patient and family expressed appreciation for the spiritual and emotional support they received. Plan: Follow up visits recommended for more prayers and support.

## 2022-12-05 PROBLEM — G93.5 MIDLINE SHIFT OF BRAIN WITH BRAIN COMPRESSION (HCC): Status: ACTIVE | Noted: 2022-01-01

## 2022-12-05 NOTE — CARE COORDINATION
Transitional Planning:  Spoke with family at beside. They are awaiting palliative. CM to follow up once palliative has seen pt.

## 2022-12-05 NOTE — PROGRESS NOTES
Physical Therapy        Physical Therapy Cancel Note      DATE: 2022    NAME: Noah Owen  MRN: [id number]   : 1940      Patient not seen this date for Physical Therapy due to:    stroke alert called this AM, pt transported off floor  for stat CT scans     Next Scheduled Treatment:       Electronically signed by Lorena Juares PTA on 2022 at 3:19 PM

## 2022-12-05 NOTE — PROGRESS NOTES
[facility] [address]  PROGRESS NOTE    Shift date: 12/5/2022   Shift day: Monday   Shift # 1    Room # 6471/2440-84   Name: Chung Alcantara                Gnosticist: Bharathi Mckinney [address] of [address]:     Referral:  Stroke Alert    Admit Date & Time: 11/29/2022  8:52 PM    Assessment:  Chung Alcantara is a 78 y.o. female. Per nurse, pt was restless overnight and unresponsive this morning. Nurse called to notify daughter. Daughter will arrive later this morning. Patient's code is [facility] with no intubation. Intervention:   provided a ministry of presence and silent prayer as the medical team assessed patient. Plan:   will follow up at later time when family arrives.       Electronically signed by Emily Hills on 12/5/2022 at 8:49 AM.  [facility]  [phone]

## 2022-12-05 NOTE — PROGRESS NOTES
Occupational [address] [employer]  Occupational Therapy Not Seen Note    DATE: 2022    NAME: Luz Hernandez  MRN: [id number]   : 1940      Patient not seen this date for Occupational Therapy due to:     Other: stroke alert called this AM, pt transported off floor  for stat CT scans    Next Scheduled Treatment: Attempt in PM or     Electronically signed by SIVAN Frederick on 2022 at 8:15 AM

## 2022-12-05 NOTE — CONSULTS
Department of Endovascular Neurosurgery  Resident Consult Note  Stroke Alert paged @ 802AM  4A Room # [phone] to patient bedside @ 161BW        Reason for Consult:  unresponsive/stroke alert  Requesting Physician:  Trauma team  Endovascular Neurosurgeon:   [x]Dr. Fernando Lindquist  []Dr. Armando Gonzalez  []Dr. Mendel Schimke   []    History Obtained From:  electronic medical record, med staff, reason patient could not give history:  altered mental status and unresponsive    CHIEF COMPLAINT:       AMS    HISTORY OF PRESENT ILLNESS:       The patient is a 78 y.o. female who presented after falling down a flight of stairs. Patient reportedly had an unwitnessed fall down approx. 10 steps and was found down. GCS on initial arrival was 14. Initial CT Head left posterior SDH and right SAH. CTA did no reveal an aneurysm. DSA performed by endovascular team on 12/3. No aneurysm, no AVM, no DAVF  Thought to be either a traumatic SAH or perimesencephalic SAH. Per report, patient was restless and pulling off leads around 630AM  Found by staff to be sonorous and not responding around 750AM    Last know well: 12/5/2022 @ 630AM    On presentation:  BP: 202/98  BSL: 136    Prior to arrival patient was on  Antiplatelets/anticoagulants: on hold due to SDH, SAH  Statins: no       PAST MEDICAL HISTORY :       Past Medical History:        Diagnosis Date    Non-smoker        Past Surgical History:    No past surgical history on file. Social History:   Social History     Socioeconomic History    Marital status:       Spouse name: Not on file    Number of children: Not on file    Years of education: Not on file    Highest education level: Not on file   Occupational History    Not on file   Tobacco Use    Smoking status: Not on file    Smokeless tobacco: Not on file   Substance and Sexual Activity    Alcohol use: Not on file    Drug use: Not on file    Sexual activity: Not on file   Other Topics Concern    Not on file   Social History Narrative    Not on file     Social Determinants of Health     Financial Resource Strain: Not on file   Food Insecurity: Not on file   Transportation Needs: Not on file   Physical Activity: Not on file   Stress: Not on file   Social Connections: Not on file   Intimate Partner Violence: Not on file   Housing Stability: Not on file       Family History:   No family history on file. Allergies:  Latex, Ibuprofen, Keflex [cephalexin], Lactose intolerance (gi), Adhesive tape, Camphor, Clindamycin, Codeine, Penicillins, and Zithromax [azithromycin]    Home Medications:  Prior to Admission medications    Medication Sig Start Date End Date Taking? Authorizing Provider   lisinopril (PRINIVIL;ZESTRIL) 10 MG tablet Take 10 mg by mouth daily   Yes Historical Provider, MD   hydroCHLOROthiazide (HYDRODIURIL) 25 MG tablet Take 25 mg by mouth daily   Yes Historical Provider, MD   ALPRAZolam (XANAX) 0.25 MG tablet Take 0.25 mg by mouth nightly as needed for Sleep (BID as needed).    Yes Historical Provider, MD   acetaminophen (TYLENOL) 500 MG tablet Take 500 mg by mouth every 6 hours as needed for Pain   Yes Historical Provider, MD   aspirin 81 MG chewable tablet Take 81 mg by mouth daily   Yes Historical Provider, MD   bimatoprost (LUMIGAN) 0.01 % SOLN ophthalmic drops Place 1 drop into both eyes nightly   Yes Historical Provider, MD   timolol (BETIMOL) 0.25 % ophthalmic solution Place 1-2 drops into both eyes 2 times daily   Yes Historical Provider, MD       Current Medications:   Current Facility-Administered Medications: enoxaparin Sodium (LOVENOX) injection 30 mg, 30 mg, SubCUTAneous, BID  levETIRAcetam (KEPPRA) tablet 500 mg, 500 mg, Oral, BID  levoFLOXacin (LEVAQUIN) 500 MG/100ML infusion 500 mg, 500 mg, IntraVENous, Once  sodium chloride flush 0.9 % injection 5-40 mL, 5-40 mL, IntraVENous, 2 times per day  sodium chloride flush 0.9 % injection 5-40 mL, 5-40 mL, IntraVENous, PRN  0.9 % sodium chloride infusion, , IntraVENous, PRN  acetaminophen (TYLENOL) tablet 650 mg, 650 mg, Oral, Q4H PRN  diphenhydrAMINE-zinc acetate cream, , Topical, TID PRN  latanoprost (XALATAN) 0.005 % ophthalmic solution 1 drop, 1 drop, Both Eyes, Nightly  timolol (TIMOPTIC) 0.25 % ophthalmic solution 1 drop, 1 drop, Both Eyes, BID  lisinopril (PRINIVIL;ZESTRIL) tablet 10 mg, 10 mg, Oral, Daily  hydroCHLOROthiazide (HYDRODIURIL) tablet 25 mg, 25 mg, Oral, Daily  senna (SENOKOT) tablet 8.6 mg, 1 tablet, Oral, Nightly  propranolol (INDERAL) tablet 10 mg, 10 mg, Oral, TID  sodium chloride flush 0.9 % injection 5-40 mL, 5-40 mL, IntraVENous, PRN  ondansetron (ZOFRAN-ODT) disintegrating tablet 4 mg, 4 mg, Oral, Q8H PRN **OR** ondansetron (ZOFRAN) injection 4 mg, 4 mg, IntraVENous, Q6H PRN  polyethylene glycol (GLYCOLAX) packet 17 g, 17 g, Oral, Daily  acetaminophen (TYLENOL) tablet 1,000 mg, 1,000 mg, Oral, 3 times per day    REVIEW OF SYSTEMS:     Unable to assess due to condition    PHYSICAL EXAM:       BP (!) 222/114   Pulse (!) 132   Temp 97.9 °F (36.6 °C) (Temporal)   Resp 18   Ht 4' 9\" (1.448 m)   Wt 145 lb (65.8 kg)   SpO2 100%   BMI 31.38 kg/m²     CONSTITUTIONAL:  Unable to arouse, breathing with NRB   HEAD:  normocephalic   EYES:  Pupils 5mm b/l, sluggish   ENT:  moist mucous membranes   NECK:  supple   LUNGS:  Equal air entry bilaterally   CARDIOVASCULAR:  normal s1 / s2   ABDOMEN:  Soft, no rigidity   NEUROLOGIC:  Mental Status:  Unresponsive             Cranial Nerves:    Limited, pupils equal but sluggish    Motor Exam:    No spontaneous movement    Sensory:    Touch:    Does not localize or withdraw to painful stimuli      Coordination/Dysmetria:  Unable to test    Dysdiadochokinesia:  N/A    Gait:  not tested due to condition    INITIAL NIH STROKE SCALE:    Time Performed:  810 AM     1a. Level of consciousness:  3 - responds only with reflex motor or automatic effects or totally unresponsive, flaccid, areflexic  1b.   Level of consciousness questions:  2 - answers neither question correctly  1c. Level of consciousness questions:  2 - performs neither task correctly  2. Best Gaze:  2 - forced deviation, or total gaze paresis not overcome by oculocephalic maneuver  3. Visual:  0 - no visual loss  4. Facial Palsy:  0 - normal symmetric movement  5a. Motor left arm:  4 - no movement  5b. Motor right arm:  4 - no movement  6a. Motor left le - no movement  6b. Motor right le - no movement  7. Limb Ataxia:  0 - absent  8. Sensory:  2 - severe to total sensory loss; patient is not aware of being touched in face, arm, leg  9. Best Language:  3 - mute, global aphasia; no usable speech or auditory comprehension  10. Dysarthria:  UN - intubated or other physical barrier  11.   Extinction and Inattention:  0 - no abnormality    TOTAL:  30     SKIN:  no rash      Modified Umair Score Scale:     [] Zero: No symptoms at all   [] 1: No significant disability despite symptoms; able to carry out all usual duties and activities   [] 2: Slight disability; unable to carry out all previous activities, but able to look after own affairs without assistance   [] 3:Moderate disability; requiring some help, but able to walk without assistance   [] 4: Moderately severe disability; unable to walk and attend to bodily needs without assistance   [x] 5:Severe disability; bedridden, incontinent and requiring constant nursing care and attention    LABS AND IMAGING:     CBC with Differential:    Lab Results   Component Value Date/Time    WBC 10.3 2022 06:02 AM    RBC 3.12 2022 06:02 AM    HGB 9.9 2022 06:02 AM    HCT 28.6 2022 06:02 AM     2022 06:02 AM    MCV 91.7 2022 06:02 AM    MCH 31.7 2022 06:02 AM    MCHC 34.6 2022 06:02 AM    RDW 13.0 2022 06:02 AM    LYMPHOPCT 9 2022 06:02 AM    MONOPCT 7 2022 06:02 AM    BASOPCT 0 2022 06:02 AM    MONOSABS 0.74 2022 06:02 AM LYMPHSABS 0.96 12/04/2022 06:02 AM    EOSABS 0.14 12/04/2022 06:02 AM    BASOSABS 0.04 12/04/2022 06:02 AM         BMP:    Lab Results   Component Value Date/Time     12/04/2022 06:02 AM    K 3.3 12/04/2022 06:02 AM    CL 99 12/04/2022 06:02 AM    CO2 22 12/04/2022 06:02 AM    BUN 7 12/04/2022 06:02 AM    LABALBU 4.3 11/29/2022 09:17 PM    CREATININE 0.49 12/04/2022 06:02 AM    CALCIUM 8.5 12/04/2022 06:02 AM    LABGLOM >60 12/04/2022 06:02 AM    GLUCOSE 107 12/04/2022 06:02 AM       Radiology Review:    1.) CT brain without contrast:  Impression   Large left cerebral hemispheric subdural hematoma measuring 2.2 cm resulting   in severe midline shift from left to right measuring 2 cm. The hematoma   previously measured 1 cm. Small subdural hematoma involving the right cerebral hemisphere and falx not   significantly changed       Mild subarachnoid hemorrhage within the right cerebral hemisphere which has   improved       The results examination were conveyed to Dr. Carrillo Lauren on 12/05/2022 at 8:51 a.m.       2.) CTA Head/Neck:  Impression   Large left subdural hematoma resulting in marked midline shift from left to   right measuring 2 cm. This is caused a misregistration on the [employer]   software involving the left MCA. No intracranial large vessel occlusion or   aneurysm is detected       No significant cervical carotid stenosis or vertebral artery stenosis       12 mm mass within the anterior superficial portion of the right parotid   gland. This finding may represent a lymph node versus a benign/malignant   salivary gland tumor. Follow-up examination is suggested to ensure stability.        ASSESSMENT AND PLAN:       Patient Active Problem List   Diagnosis    Fall at home, initial encounter    Subdural hematoma    Traumatic subarachnoid hemorrhage with loss of consciousness of 30 minutes or less ([address]    Encounter for palliative care    SAH (subarachnoid hemorrhage) ([address]       Assessment 78 y.o. female who presented after falling down a flight of stairs. Patient reportedly had an unwitnessed fall down approx. 10 steps and was found down. GCS on initial arrival was 14. Initial CT Head left posterior SDH and right SAH. CTA did no reveal an aneurysm. DSA performed by endovascular team on 12/3. No aneurysm, no AVM, no DAVF  Thought to be either a traumatic SAH or perimesencephalic SAH. Per report, patient was restless and pulling off leads around 630AM  Found by staff to be sonorous and not responding around 750AM    Last Known Well (date and time): 12/5/2022 @ 630AM    2. Candidate for IV Tenecteplase therapy     Yes []     No  [x] due to the following exclusion criteria: SDH, SAH    3. Candidate for Thrombectomy    Yes []      No [x] due to the following exclusion criteria: no LVO    - Discussed with Dr. Karyn Ford     Recommendations:    Neurosurgical evaluation given the increased SDH and condition   Maintain SBP<140  Additional recommendations may follow    Please contact [facility] with any changes in patients neurologic status. Thank you for your consult.        Sonya Romero MD   PGY 4 Neurology Resident  12/5/2022 at 8:06 AM

## 2022-12-05 NOTE — PROGRESS NOTES
Endovascular Neurosurgery Progress Note    SUBJECTIVE:   This am patient was noted to have significant change in neuro exam, was unarousable and both pupils dilated and not reactive. Stroke alert was called and CT head showed increased left SDH. No LVO on CTA head. Review of Systems:  CONSTITUTIONAL:  negative for fevers, chills, fatigue and malaise    EYES:  negative for double vision, blurred vision and photophobia     HEENT:  negative for tinnitus, epistaxis and sore throat    RESPIRATORY:  negative for cough, shortness of breath, wheezing    CARDIOVASCULAR:  negative for chest pain, palpitations, syncope, edema    GASTROINTESTINAL:  negative for nausea, vomiting    GENITOURINARY:  negative for incontinence    MUSCULOSKELETAL:  negative for neck or back pain    NEUROLOGICAL:  Negative for weakness and tingling  negative for headaches and dizziness    PSYCHIATRIC:  negative for anxiety      Review of systems otherwise negative. OBJECTIVE:     Vitals:    12/05/22 0801   BP:    Pulse: (!) 123   Resp:    Temp:    SpO2:         General:  Gen: normal habitus, NAD  HEENT: NCAT, mucosa moist  Cvs: RRR, S1 S2 normal  Resp: symmetric unlabored breathing  Abd: s/nd/nt  Ext: no edema  Skin: no lesions seen, warm and dry  Right groin: clean, no bruises, distal pulses are good    Neuro:  Gen: un-arousable   Lang/speech: does not follow commands  CN: non-reactive pupils b/l,  face symmetric, hearing intact, shoulder shrug symmetric, tongue midline  Motor: no withdrawal to painful stimuli b/l   Coord: deferred   DTR: deferred  Gait: deferred    NIH Stroke Scale:   1a  Level of consciousness: 3 - responds only with reflex motor or automatic effects or totally unresponsive, flaccid, areflexic   1b. LOC questions:  2 - answers neither question correctly   1c. LOC commands: 2 - performs neither task correctly   2. Best Gaze: 0 - normal   3. Visual: 0 - no visual loss   4.  Facial Palsy: 0 - normal symmetric movement 5a. Motor left arm: 4 - no movement   5b. Motor right arm: 4 - no movement   6a. Motor left le - no movement   6b  Motor right le - no movement   7. Limb Ataxia: 0 - absent   8. Sensory: 2 - severe to total sensory loss; patient is not aware of being touched in face, arm, leg   9. Best Language:  3 - mute, global aphasia; no usable speech or auditory comprehension   10. Dysarthria: 2 - severe; patient speech is so slurred as to be unintelligible in the absence of or our of proportion to any dysphagia, or is mute/anarthric    11. Extinction and Inattention: 0 - no abnormality         Total:   30     MRS: 5      LABS:   Reviewed. Lab Results   Component Value Date    HGB 9.9 (L) 2022    WBC 10.3 2022     2022     (L) 2022    BUN 7 (L) 2022    CREATININE 0.45 (L) 2022    MG 1.5 (L) 2022    APTT 25.0 2022    INR 1.0 2022      No results found for: COVID19    RADIOLOGY:   Images were personally reviewed including:    CT head done on the 22        Cerebral angiogram 12/3/22:    No aneurysm, no AVM, no DAVF, or abnormal vessels seen  This is otherwise an unremarkable cerebral angiogram    ASSESSMENT:     78year old woman who lives by herself, with medical hx of HTN, fell down a flight of stair on the 22 and found to have unconscious. Patient does not remember who long she was down, but recalled that she had a headache before the fall. CT head showed acute B SDH and diffuse thick SAH, endovascular was consulted to do a DSA to r/o aneurysm. CTA head is negative so far. DSA is also negative. Acute neuro changes on  morning, unresponsive, pupils non reactive, CT head showed increased left SDH with midline shift of 2 cm with mass effect on brain stem. CTA head no basilar occlusion. PLAN:   - will need neurosurgery consult to consider possible SDH evacuation, patient is DNR-CCA however.   - SBP goal 100-140 mm Hg  - Medical management per primary team       Case discussed with Dr. Demian Aguilera attending.     Angela Olivas MD   Stroke, [facility]  [address] [place]  Electronically signed 12/5/2022 at 9:20 AM

## 2022-12-05 NOTE — PROGRESS NOTES
Writing nurse called daughter to update on new patient condition. Daughter relates she is ok with getting new CT scans and wants patient to remain a DNR-CCA without intubation. Daughter is on her way to the hospital now.

## 2022-12-05 NOTE — PROGRESS NOTES
Resuscitation/Code Status Note on Marge Mederos (YOB: 1940)    At 13:20 on December 5, 2022, resuscitation/code status decision was based on a thorough discussion with the patient's healthcare power of  - Farrukhawa Moon (daughter) . The code status was made DNR-CC No additional code details.     Electronically signed by Lizzeth Mast DO on 12/5/22 at 1:23 PM EST

## 2022-12-05 NOTE — PROGRESS NOTES
Stroke RN note:    Arrived to find pt in bed in room 408 surrounded by primary care team.  Brief assessment prior to packaging pt up with travel monitor and O2 to CT. Pt unconscious / unresponsive, hypertensive and tachycardic. Pt follows no commands and minimal response if at all to noxious stimuli. Face symmetric. Pupils 5 and minimal reaction. While in CT 10 mg Labetalol given slow IVP for HR and BP per verbal order.

## 2022-12-05 NOTE — PROGRESS NOTES
Notified juan and trauma that pt passed away. family at bedside. Contacted [facility] due to age pt is not a candidate. her referral # is [id number]. Notified  due to fall  Notified palliative, neurosurgery and Dr. Diana Liu via perfect serve. Notified Hospice  Spoke to Fay Cortez at the coroners office and she states pt will be a coroners case. Stated to take pt to the [facility].

## 2022-12-05 NOTE — PROGRESS NOTES
[address] [facility] Vam[address] Barrera 81   Patient Death Note  DEATH   Shift date: 2022    Shift day: Monday  Shift #: 2                 Room # 8787/1718-69   Name: Torito Denson            Age: 78 y.o. Gender: female          Christian: Kulwinder Cervantes Date & Time: 2022  8:52 PM     Referral: Nurse   Actual date of death: 2022   TOD: 5:00 pm       SITUATION AT DEATH:  On 2022 patient's condition shifted and the patient was found to be unresponsive due to the worsening of their SDH. Patient's Sarthak Florian (daughter), made the decision to change code status from [facility] to [facility]. Patient  naturally. IS THIS A 'S CASE? Yes    SPIRITUAL/EMOTIONAL INTERVENTION:   visited with patient's family, who shared about the life and rigoberto of the patient.  prayed with the patient's family and provided a ministry of presence throughout the grieving process. Many family members and friends were bedside and came to visit with patient at time of death. Family Received Grief Packet? No     NAME AND PHONE NUMBER OF DOCTOR SIGNING DEATH CERTIFICATE: Dilshad Nelson MD [phone] (Update: Patient will be going to the )     Lance Toure will contact [facility] [phone] at earliest convenience. Copy of COMPLETED Release of Body Form Received? Yes    Patient's belongings: None noted by      HOME:  Name: [facility]: [place], [place]  Phone Number: [phone]    NEXT OF KIN:  Name: Alicia Donato \"Guerda\"  Riana Kelleycleopatra  Relationship: Daughter  Street Address: [address] [place] [address] City: [facility]  Zip code: [address]   Phone Number: [phone]    ANY FOLLOW-UP NEEDED? Yes    IF SO, WHAT? Mail grief packet with condolence card.     Electronically signed by Lara Rice, on 2022 at 5:42 PM.  [address] [place] [address]  [phone]

## 2022-12-05 NOTE — PLAN OF CARE
Problem: Discharge Planning  Goal: Discharge to home or other facility with appropriate resources  12/5/2022 0743 by Froylan Silveira RN  Outcome: Progressing  12/5/2022 0201 by Zen Carpenter RN  Outcome: Progressing  Flowsheets (Taken 12/4/2022 2030)  Discharge to home or other facility with appropriate resources:   Identify barriers to discharge with patient and caregiver   Arrange for needed discharge resources and transportation as appropriate   Identify discharge learning needs (meds, wound care, etc)   Refer to discharge planning if patient needs post-hospital services based on physician order or complex needs related to functional status, cognitive ability or social support system     Problem: Pain  Goal: Verbalizes/displays adequate comfort level or baseline comfort level  12/5/2022 0743 by Froylan Silveira RN  Outcome: Progressing  12/5/2022 0201 by Zen Carpenter RN  Outcome: Progressing     Problem: Safety - Adult  Goal: Free from fall injury  12/5/2022 0743 by Froylan Silveira RN  Outcome: Progressing  12/5/2022 0201 by Zen Carpenter RN  Outcome: Progressing  Flowsheets (Taken 12/4/2022 2031)  Free From Fall Injury: Instruct family/caregiver on patient safety     Problem: ABCDS Injury Assessment  Goal: Absence of physical injury  12/5/2022 0743 by Froylan Silveira RN  Outcome: Progressing  12/5/2022 0201 by Zen Carpenter RN  Outcome: Progressing  Flowsheets (Taken 12/4/2022 2031)  Absence of Physical Injury: Implement safety measures based on patient assessment     Problem: Skin/Tissue Integrity  Goal: Absence of new skin breakdown  Description: 1. Monitor for areas of redness and/or skin breakdown  2. Assess vascular access sites hourly  3. Every 4-6 hours minimum:  Change oxygen saturation probe site  4. Every 4-6 hours:  If on nasal continuous positive airway pressure, respiratory therapy assess nares and determine need for appliance change or resting period.   12/5/2022 0743 by Froylan Silveira RN  Outcome: Progressing  12/5/2022 0201 by Anju Atkinson RN  Outcome: Progressing     Problem: Chronic Conditions and Co-morbidities  Goal: Patient's chronic conditions and co-morbidity symptoms are monitored and maintained or improved  12/5/2022 0743 by Carmencita Kerr RN  Outcome: Progressing  12/5/2022 0201 by Anju Atkinson RN  Outcome: Progressing  Flowsheets (Taken 12/4/2022 2030)  Care Plan - Patient's Chronic Conditions and Co-Morbidity Symptoms are Monitored and Maintained or Improved:   Monitor and assess patient's chronic conditions and comorbid symptoms for stability, deterioration, or improvement   Collaborate with multidisciplinary team to address chronic and comorbid conditions and prevent exacerbation or deterioration   Update acute care plan with appropriate goals if chronic or comorbid symptoms are exacerbated and prevent overall improvement and discharge

## 2022-12-05 NOTE — PROGRESS NOTES
12/05/22 0836   Oxygen Therapy/Pulse Ox   O2 Therapy Oxygen   O2 Device Non-rebreather mask   O2 Flow Rate (L/min) 15 L/min   Heart Rate 85   Resp 19   SpO2 95 %     RN initiated Stroke alert and RRT responded with team.     Patient found on NRB 15L by RN. SpO2 95%. RR WNL.

## 2022-12-05 NOTE — CONSULTS
Palliative Care Inpatient Consult    NAME:  Evelyn Borja RECORD NUMBER:  [id number]  AGE: 78 y.o. GENDER: female  : 1940  TODAY'S DATE:  2022    Reasons for Consultation:    Provision of information regarding PC and/or hospice philosophies  Complex, time-intensive communication and interdisciplinary psychosocial support  Clarification of goals of care and/or assistance with difficult decision-making  Guidance in regards to resources and transition(s)    Code Status: [facility] family is requesting Dnrcc. Perfect serve message sent by floor nurse to Dr. Kris Miller    Members of [facility] AND [facility] team contributing to this consultation are :  Breana Suárez    History of Present Illness     The patient is a 78 y.o. Unavailable / unknown female who presents with Fall    Referred to Palliative Care by    Alex Croft   [] Nursing  [] Family Request   [] Other:       She was admitted to the primary service for Subdural hematoma [S06. 5XAA]  Fall at home, initial encounter [W19. XXXA, Y92.009]. Her hospital course has been associated with Fall at home, initial encounter. The patient has a complicated medical history and has been hospitalized since 2022  8:52 PM.    Active Hospital Problems    Diagnosis Date Noted    Subdural hematoma [S06. 5XAA] 2022     Priority: Medium    Traumatic subarachnoid hemorrhage with loss of consciousness of 30 minutes or less ([facility] [S06.6X1A] 2022     Priority: Medium    Encounter for palliative care [Z51.5] 2022     Priority: Medium    SAH (subarachnoid hemorrhage) ([facility] [I60.9] 2022     Priority: Medium    Fall at home, initial encounter [W19. Brandy Apo, L98.224] 2022     Priority: Medium       Data        Code Status: DNR-CCA     ADVANCED CARE PLANNING:  Patient has capacity for medical decisions: no  Health Care Power of : yes  Living Will: yes     Personal, Social, and Family History  Marital Status:   Living situation:nursing home  Quin/Spiritual History:   not asked, clearlette was in room with pt and family upon my arrival for visit  Psychological Distress: mild  Does patient understand diagnosis/treatment? not asked  Does family/caregiver understand diagnosis/treatment? yes    Assessment        Palliative Performance Scale:    ___100% Full ambulation; normal activity and work; no evidence of disease; able to do own self care; normal intake; fully conscious  ___90% Full ambulation; normal activity and work; some evidence of disease; able to do own self care; normal intake; fully conscious  ___80% Full ambulation; normal activity with effort; some evidence of disease; able to do own self care; normal or reduced intake; fully conscious  ___70% Ambulation reduced; unable to perform normal job/work; significant disease; able to do own self care; normal or reduced intake; fully conscious  ___60%  Ambulation reduced; cannot do hobbies/housework; significant disease; occasional assist; intake normal or reduced; fully conscious/some confusion  ___50%  Mainly sit/lie; can't do any work; extensive disease; considerable assist; intake normal or reduced; fully conscious/some confusion  ___40%  Mainly in bed; extensive disease; mainly assist; intake normal or reduced; fully conscious/ some confusion   ___30%  Bed bound; extensive disease; total care; intake reduced; fully conscious/some confusion  ___20%  Bed bound; extensive disease; total care; intake minimal; drowsy/coma  __x_10%  Bed bound; extensive disease; total care; mouth care only; drowsy/coma  ___0       Death       Risk Assessments:    Adryan Risk Score: [unfilled]    Readmission Risk Score: 15.2        1 Year Mortality Risk Score: @1YEARMORTALITYRISK@     Plan        Palliative Interaction: Notified of consult. Notified of patient's status change. Pt's family at bedside and waiting room. Reviewed course of care. Patient was admitted post fall.   She was found down at home with left SDH and scattered subarachnoid hemorrhage. Pt was awake, alert and oriented x3 following commands. This am patient condition deteriorated and stroke alert was called. Patient was found to be unresponsive by staff. Pupils nonreactive per physician. CT head showing worsened large left subdural hematoma 2.2 cm with midline shift. Neuro surgery staff met with family this am.  Updates received from bedside nurse Nikki Marino. Went to see patient and family with [facility] medical student. Introduced self and role. Mary Olmstead is resting in bed not responsive to writer. Assessed family's understanding of condition. Family relates that the stroke is worse and that their mom and sister would want no cpr and no intubation. We discussed [facility] and [facility]. Family is all in agreement for [facility]. Bedside nurse Dayana updated Dr. Mitchell Byrne of [facility] request.  Discussed comfort care measures with family. Offered hospice evaluation and meeting with family. Discussed that hospice nurse will come out and evaluate patient and if patient is able to transfer to inpatient unit or if would be converted to hospice bed here at hospital.  Explained hospice care can be done in hospice facility, nursing facility or at home. [facility] and [facility] since they have inpatient facility and we could possibly do bed conversion. Family is not from this area but more [facility]. Decision to meet with [facility] hospice because of [place] inpatient location. Discussed that if patient is able to transfer out to hospice that may want to consider [facility] hospice with inpatient center at [facility] or a nursing facility in  area. Support and care expressed to family. Determined who family members in room were. Encouraged them to contact me as needed and gave them my office number. Comfort cart already in the room. Contacted [address] admissions [employer]. Notified her of request to see patient and family. Information given. Social Security number obtained from daughter and given to [facility] - DEDE SANDOVAL. Hospice nurse will be out to see patient and  family at 2:30pm today 12/5/22. Updated Julieta Gutierrez rn that family is requesting [facility], hospice care with [address], may end up being a bed conversion. Asked Julieta Gutierrez to please request [facility] order and order for hospice from Dr. Baltazar Hassan. Julieta Gutierrez sent perfect serve message and notified Dr Baltazar Hassan that hospice would see at 2:30pm today. Notified family at bedside that hospice would be out to meet with them today at 2:30pm.      Education/support to staff  Education/support to family  Education/support to patient  Discharge planning/helping to coordinate care  Communications with primary service  Providing support for coping/adaptation/distress of family  Managing anticipatory grief  Addressing bereavement needs  Discussing meaning/purpose   Specific spiritual beliefs/practices  Decision making regarding life prolonging treatment  Decisional capacity assessed  Clarification of medical condition to patient and family  Code status clarified: [facility]  Code status clarified: [facility]  Palliative care orders introduced  Provided information about hospice  Recognizing, reflecting, and empathizing with family members' anticipatory grief    Principle Problem/Diagnosis:  Subdural hematoma [S06. 5XAA]  Fall at home, initial encounter [W19. Wilma Law, Y92.009]    Goals of care evaluation:  The patient goals of care are provide comfort care/support/palliation/relieve suffering, preparation for death, and hospice care   Goals of care discussed with:    [] Patient independently    [] Patient and Family    [x] Family or Healthcare DPOA independently    [] Unable to discuss with patient, family/DPOA not present    Code Status  DNR-CCA    Other recommendations:  Please call with any palliative questions or concerns. Palliative Care Team is available via perfect serve or via phone - [phone].      Palliative Care will continue to follow Ms. Steve's care as needed. Thank you for allowing Palliative Care to participate in the care of Ms. Azalia Arriaga .     Electronically signed by   Sandra Giron RN  Palliative Care Team  on 12/5/2022 at 12:44 PM    Palliative care office: [phone]

## 2022-12-05 NOTE — PROGRESS NOTES
Pt s LKW was 0630 night nurse stated pt was restless and pulling off monitor patches and IV she replaced IV at 0630. At 0750 pt was snorous and not responding BS checked and it was 136 pt BP was in the 200s. Sats were dropping into the 70s non rebreather placed at 15L pt o2 sats up to high 90s. Pts pupils were an 8 and fixed. Stroke alert called and pt taken to CT.

## 2022-12-05 NOTE — PLAN OF CARE
Neurosurgery    Patient was previously seen by neurosurgery and had signed off   She was last seen by us on 12/3 which we were seeing her for acute Left side SDH and scattered subarachnoid hemorrhage s/p falling down 13 steps and was on aspirin  At that time she was awake alert and oriented x3 following all commands and had equal strength     We were paged at [phone] 12/5/2022 regarding the worsen findings on most recent CT head which was completed due to patient being unresponsive with nonreactive pupils   CT head showing worsen large left subdural hematoma 2.2 cm with midline shift     Plan  Dr Mendez Moore and myself present in room with multiple family members at bedside,  we briefly discussed new findings on recent scan, due to patients and family wishes of no intubation no neurosurgical intervention required.        Electronically signed by PANTERA Smith NP on 12/5/2022 at 10:56 AM

## 2022-12-05 NOTE — DEATH NOTES
Death Pronouncement Note  Patient's Name: Aniya Capone   Patient's YOB: 1940  MRN Number: [id number]    Admitting Provider: Maru Encarnacion MD  Attending Provider: No att. providers found    Patient was examined and the following were absent: Pulses, Blood Pressure, and Respiratory effort    I declared the patient dead on 12/5/2022 at 5:00 PM    Preliminary Cause of Death: Intracranial hemorrhage     Electronically signed by Donna Resendiz DO on 12/5/22 at 6:30 PM EST

## 2022-12-05 NOTE — PROGRESS NOTES
PROGRESS NOTE          PATIENT NAME: Zainab Wolf [place] RECORD NO. [id number]  DATE: 12/5/2022  SURGEON: Dr. Adams Stanley: Hilario Paul MD    HD: # 6    ASSESSMENT    Patient Active Problem List   Diagnosis    Fall at home, initial encounter    Subdural hematoma    Traumatic subarachnoid hemorrhage with loss of consciousness of 30 minutes or less ([facility] [address]    Encounter for palliative care    SAH (subarachnoid hemorrhage) [facility])       MEDICAL DECISION MAKING AND PLAN  Neuro:  Pt somnolent and unresponsive this morning. Pupils non-reactive to light. Code stroke called   Injuries: Right SAH and bilateral SDH with minimal rightward midline shift   Repeat head CT demonstrates worsening of SDH with 2cm midline shift with herniation. Neuro-endovascular  Diagnostic angio 12/2. Unremarkable exam without aneurysm, AVM, DAVF or abnormal vessels seen. Neurosurgery  No operative intervention indicated  Keppra 500 BID  Pain control: Tylenol  Palliative consult. Pt's family will likely pursue DNR-CC, waiting on additional family to arrive before decision. CV/Heme  BP: 222/114 when pt found to be unresponsive  Home meds: lisinopril, hydrochlorothiazide  Pulm  Pt placed on non-rebreather after being found somnolent and unresponsive. SpO2> 97% on non-rebreather  Renal/lytes  UOP: voiding spontaneously  7. Endocrine        1. BG wnl  Musculoskeletal  TERT complete  Skin- pt had skin irritation under telemetry patches, benadryl ointment ordered with improvement. Dispo: Pt had acute worsening of mental status. Found unresponsive this AM, pupils non-reactive and fixed. CT head demonstrating worsening SDH with 2cm of midline shift. Palliative care consult placed. Family states they will likely pursue DNR-CC status but would like to wait until more family arrives for further discussions. Chief Complaint: \"AMS\"    SUBJECTIVE    Graciella Celeste is has significantly worsened since yesterday.  No acute events overnight. Found unresponsive this morning. Pupils fixed and non-reactive to light. Code stroke called. Worsening bleed on CT scan. Family contacted and palliative consult placed. Likely to make [facility] and pursue hospice care. OBJECTIVE  VITALS: Temp: Temp: 97.9 °F (36.6 °C)Temp  Av.9 °F (36.6 °C)  Min: 97.8 °F (36.6 °C)  Max: 98.1 °F (97.1 °C) BP Systolic (03OFN), DIK:101 , Min:127 , CORY:929   Diastolic (09VNN), EKC:70, Min:47, Max:70   Pulse Pulse  Av  Min: 54  Max: 70 Resp Resp  Av.7  Min: 20  Max: 24 Pulse ox SpO2  Av %  Min: 91 %  Max: 97 %  GENERAL: Somnolent, unresponsive. NEURO: Pupils fixed at 3mm and non-reactive to light. Unable to arouse. HEENT: NCAT  : deferred  LUNGS: Sonorous respirations. Non-rebreather. SpO2 >96%  HEART: regular rhythm and tachycardic  ABDOMEN: non-distended  EXTREMITY: no cyanosis, clubbing or edema    I/O last 3 completed shifts: In: 1700 [P.O.:550; I.V.:1150]  Out: -     Drain/tube output: In: 1400 [P.O.:250; I.V.:1150]  Out: -     LAB:  CBC:   Recent Labs     22  0448 22  0602   WBC 9.1 10.3   HGB 10.2* 9.9*   HCT 29.9* 28.6*   MCV 92.9 91.7    240     BMP:   Recent Labs     22  0448 22  0602   * 133*   K 3.4* 3.3*    99   CO2 21 22   BUN 9 7*   CREATININE 0.47* 0.49*   GLUCOSE 92 107*     COAGS: No results for input(s): APTT, PROT, INR in the last 72 hours. RADIOLOGY:  CT HEAD WO CONTRAST    Result Date: 2022  Large left cerebral hemispheric subdural hematoma measuring 2.2 cm resulting in severe midline shift from left to right measuring 2 cm. The hematoma previously measured 1 cm. Small subdural hematoma involving the right cerebral hemisphere and falx not significantly changed. Mild subarachnoid hemorrhage within the right cerebral hemisphere which has improved. The results of the examination were conveyed to Dr. No Villalobos on 2022 at 8:51 a.m.      CTA HEAD NECK W CONTRAST    Result Date: 12/5/2022  Large left subdural hematoma resulting in marked midline shift from left to right measuring 2 cm. This has caused a misregistration on the "[employer] AI software involving the left MCA. No intracranial large vessel occlusion or aneurysm is detected No significant cervical carotid stenosis or vertebral artery stenosis. 12 mm mass within the anterior superficial portion of the right parotid gland. This finding may represent a lymph node versus a benign/malignant salivary gland tumor. Follow-up examination is suggested to ensure stability. Belle Salinas DO  12/5/22, 7:55 AM        Attending Note    With increasing SDH and shift, patient mental status has acutely deteriorated and family is making patient DNR-cc  I have reviewed the above TECSS note(s) and I either performed the key elements of the medical history and physical exam or was present with the resident when the key elements of the medical history and physical exam were performed. I have discussed the findings, established the care plan and recommendations with Resident, TECSS RN, bedside nurse.     Aida Silva MD  12/5/2022  2:49 PM

## 2022-12-05 NOTE — PROGRESS NOTES
[facility] [address] [address]  PROGRESS NOTE    Shift date: 12/5/2022   Shift day: Monday   Shift # 1    Room # 2381/2047-97   Name: Sara Boo                Mormonism: [employer] 31 of Church:     Referral:  Follow up after stroke alert    Admit Date & Time: 11/29/2022  8:52 PM    Assessment:  Sara Boo is a 78 y.o. female. A stroke alert was called around 8:00 am this morning. Upon entering the room writer observes several family members present. Daughters spoke lovingly about patient and said they want to be present as she \"transitions\". Intervention:  Writer introduced self and title as  Writer offered space for family  to express feelings, needs, and concerns and provided a ministry presence.  provided a prayer blanket and prayed with family at bedside. Outcome:  Family appeared comforted by support and prayer and thanked . Plan:  Chaplains will remain available to offer spiritual and emotional support as needed. Electronically signed by Garo San on 12/5/2022 at 10:13 AM.  [facility]  [phone]       12/05/22 1012   Encounter Summary   Service Provided For: Family   Referral/Consult From:   (Follow up)   Last Encounter  12/05/22   Complexity of Encounter Moderate   Begin Time 0940   End Time  0952   Total Time Calculated 12 min   Crisis   Type Follow up   Assessment/Intervention/Outcome   Assessment Coping;Tearful   Intervention Discussed belief system/Worship practices/rigoberto; Explored/Affirmed feelings, thoughts, concerns;Prayer (assurance of)/[place]   Outcome Engaged in conversation;Expressed feelings, needs, and concerns;Expressed Gratitude;Receptive

## 2022-12-06 NOTE — PROGRESS NOTES
Security called for a body due to  coming to  the patient. No body or paperwork was present at the time.  spoke to the nurse who states that the patient is ready for reception.  could not find the release of body form and made an additional copy.  updated security and verified body's presence.          12/05/22 2101   Encounter Summary   Service Provided For: Patient not available   Referral/Consult From: Other    Support System Family members   Last Encounter  12/05/22   Complexity of Encounter Low   Begin Time 2050   End Time  2100   Total Time Calculated 10 min   Encounter    Type Follow up   Grief, Loss, and Adjustments   Type End of Life  ( preparing for pick-up)   Assessment/Intervention/Outcome   Assessment Unable to assess     Electronically signed by Gladys Slade on 12/5/2022 at 9:05 PM

## 2022-12-07 NOTE — PROGRESS NOTES
Physician Progress Note      PATIENT:               Folr Sheikh  CSN #:                  [id number]  :                       1940  ADMIT DATE:       2022 8:52 PM  [address] [facility] [place] DATE:        2022 5:00 PM  RESPONDING  PROVIDER #:        Angella Pendleton MD          QUERY TEXT:    Pt admitted with traumatic SDH and Bilat SAH. Pt noted to have change in   mental status with  increased left SDH with midline shift of 2 cm with mass   effect on brain stem. If clinically significant, please document in progress notes and discharge   summary if you are evaluating/treating any of the following: The medical record reflects the following:  Risk Factors: 78 yr old s/p fall down steps  Clinical Indicators: GCS 14 d/t confusion on admit.  change in mental   status. Repeat CT head  : Large left cerebral hemispheric subdural   hematoma measuring 2.2 cm resulting in severe midline shift from left to right   measuring 2 cm. The hematoma previously measured 1 cm. Per Endovascular note : Acute neuro changes on  morning,   unresponsive, pupils non reactive, CT head showed increased left SDH with   midline shift of 2 cm with mass effect on brain stem. Treatment: Endovascular consult, CT head x3, DNR-CCA changed to [facility] w/   mental status change    Thank you please contact me for any questions! 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     Query created by: Zana Bone on 2022 9:47 AM      Electronically signed by:  Angella Pendleton MD 2022 12:35 PM

## 2023-01-05 NOTE — DISCHARGE SUMMARY
DISCHARGE SUMMARY:    PATIENT NAME:  Vanda Felty  YOB: 1940  MEDICAL RECORD NO. [id number]  DATE: 23  PRIMARY CARE PHYSICIAN: Christophe Puentes MD  ADMIT DATE: 2022   DISPOSITION:    DISCHARGE DATE:  22  ADMITTING DIAGNOSIS:   Fall at home    DIAGNOSIS:   Patient Active Problem List   Diagnosis    Fall at home, initial encounter    Subdural hematoma    Traumatic subarachnoid hemorrhage with loss of consciousness of 30 minutes or less ([facility] [address]    Encounter for palliative care    SAH (subarachnoid hemorrhage) ([facility] [address]    Midline shift of brain with brain compression [facility])       CONSULTANTS:  Neurosurgery, neuroendovascular    PROCEDURES:   Diagnostic Cerebral Angiogram (22)    HOSPITAL COURSE:   Vanda Felty is a 78 y.o. female who was admitted on 2022 with left posterior SDH and R SAH after falling down several stairs while at home. Patient was admitted to the ICU and repeat CT head was stable. Diagnostic cerebral angiogram was perform on 22 and was negative for aneurysm, AVM, or DAVF. Patient was transferred to stepdown unit and discharge planning to SNF. On the morning of 22 patient was found with sonorous respirations and unresponsive. Code stroke was called and STAT CT head revealed worsening of previous intracranial hemorrhages. Goals of care discussions were held with family and patient's code status was changed to [facility]. Time of death was declared at [address] [place] [address],[address] on 22. PHYSICAL EXAMINATION:      N/a    LABS:   No results for input(s): WBC, HGB, HCT, PLT, NA, K, CL, CO2, BUN, CREATININE in the last 72 hours. DIAGNOSTIC TESTS:    XR FEMUR LEFT (MIN 2 VIEWS)    Result Date: 2022  EXAMINATION: 2  XRAY VIEWS OF THE RIGHT FEMUR;  2 XRAY VIEWS OF THE LEFT FEMUR 2022 6:48 pm COMPARISON: None. HISTORY: ORDERING SYSTEM PROVIDED HISTORY: trauma TECHNOLOGIST PROVIDED HISTORY: trauma FINDINGS: There is no evidence of acute fracture.   There is normal alignment. No acute joint abnormality. No focal osseous lesion. No focal soft tissue abnormality. No acute osseous abnormality. If there is clinical suspicion for radiographically occult fracture and the patient is unable to bear weight, recommend MRI. XR FEMUR RIGHT (MIN 2 VIEWS)    Result Date: 11/30/2022  EXAMINATION: 2  XRAY VIEWS OF THE RIGHT FEMUR;  2 XRAY VIEWS OF THE LEFT FEMUR 11/30/2022 6:48 pm COMPARISON: None. HISTORY: ORDERING SYSTEM PROVIDED HISTORY: trauma TECHNOLOGIST PROVIDED HISTORY: trauma FINDINGS: There is no evidence of acute fracture. There is normal alignment. No acute joint abnormality. No focal osseous lesion. No focal soft tissue abnormality. No acute osseous abnormality. If there is clinical suspicion for radiographically occult fracture and the patient is unable to bear weight, recommend MRI. CT HEAD WO CONTRAST    Result Date: 11/30/2022  EXAMINATION: CT OF THE HEAD WITHOUT CONTRAST  11/30/2022 12:26 pm TECHNIQUE: CT of the head was performed without the administration of intravenous contrast. Automated exposure control, iterative reconstruction, and/or weight based adjustment of the mA/kV was utilized to reduce the radiation dose to as low as reasonably achievable. COMPARISON: CT brain performed 11/30/2022. HISTORY: ORDERING SYSTEM PROVIDED HISTORY: reduced [employer] TECHNOLOGIST PROVIDED HISTORY: reduced [employer] FINDINGS: BRAIN/VENTRICLES: There is redemonstration of a left-sided subdural hemorrhage that is not significantly changed in size compared to prior examination. There is a small right-sided subdural hemorrhage that is similar in size compared to prior examination. There is scattered subarachnoid hemorrhage within the cerebral hemispheres bilaterally that is similar compared to prior examination. There is no significant mass effect or midline shift. The infratentorial structures are unremarkable.  ORBITS: The visualized portion of the orbits demonstrate no acute abnormality. SINUSES: The visualized paranasal sinuses and mastoid air cells demonstrate no acute abnormality. SOFT TISSUES/SKULL:  There is similar scalp soft tissue swelling in the left parietal region. Bilateral subdural hemorrhage with the left being larger than the right. Overall these are similar in size compared to prior examination. Subarachnoid hemorrhage within the cerebral hemispheres bilaterally that is more pronounced on the right compared to the left. Overall this is similar compared to prior examination. CT HEAD WO CONTRAST    Result Date: 11/30/2022  EXAMINATION: CT OF THE HEAD WITHOUT CONTRAST  11/30/2022 4:32 am TECHNIQUE: CT of the head was performed without the administration of intravenous contrast. Automated exposure control, iterative reconstruction, and/or weight based adjustment of the mA/kV was utilized to reduce the radiation dose to as low as reasonably achievable. COMPARISON: 11/29/2022. HISTORY: ORDERING SYSTEM PROVIDED HISTORY: sah, sdh TECHNOLOGIST PROVIDED HISTORY: sah, sdh FINDINGS: BRAIN/VENTRICLES: Again noted and unchanged is copious right hemispheric subarachnoid hemorrhage with minimal anterior to anterolateral right subdural hematoma. There also appears to be subdural hematoma extending along the right tentorial leaflet. This is minimal and although not mentioned on the report for the earlier exam, it is unchanged. Also unchanged is the more prominent left convexity subdural hematoma with maximum thickness of 13 mm. No significant midline shift. No parenchymal hemorrhage identified. Mild diffuse cerebral atrophy and chronic white matter ischemic change. ORBITS: The visualized portion of the orbits demonstrate no acute abnormality. SINUSES: The visualized paranasal sinuses and mastoid air cells demonstrate no acute abnormality. SOFT TISSUES/SKULL:  Osseous structures appear intact. Left posterolateral scalp hematoma is noted.      No significant interval change since the exam approximately 7 hours earlier with fairly extensive extra-axial hemorrhage as described above. CT HEAD WO CONTRAST    Result Date: 11/29/2022  EXAMINATION: CT OF THE HEAD WITHOUT CONTRAST  11/29/2022 9:12 pm TECHNIQUE: CT of the head was performed without the administration of intravenous contrast. Automated exposure control, iterative reconstruction, and/or weight based adjustment of the mA/kV was utilized to reduce the radiation dose to as low as reasonably achievable. COMPARISON: None. HISTORY: ORDERING SYSTEM PROVIDED HISTORY: fall TECHNOLOGIST PROVIDED HISTORY: fall Reason for Exam: trauma fall Relevant Medical/Surgical History: pt received contrast 2 hours ago, trauma attending override due to neurological changes FINDINGS: BRAIN/VENTRICLES: Copious subarachnoid hemorrhage is present at the right anterior and lateral aspects of the cerebral hemisphere most prominently involving the sylvian fissure region. There is also superimposed anterior to anterolateral right convexity minor subdural hematoma. More prominent more diffuse left convexity subdural hematoma is present with maximum thickness of approximately 13 mm as measured on coronal image 55. There is resulting 2-3 mm rightward midline shift. The ventricular system is unremarkable. There is mild diffuse cerebral atrophy and chronic white matter ischemic change. ORBITS: The visualized portion of the orbits demonstrate no acute abnormality. SINUSES: The visualized paranasal sinuses and mastoid air cells demonstrate no acute abnormality. SOFT TISSUES/SKULL:  Osseous structures appear intact. This includes the lateral aspect of the left occipital bone. There is a small to moderate localized scalp hematoma at the left posterolateral aspect. Right subarachnoid and bilateral subdural hematomas as described with minimal rightward midline shift. Mild diffuse cerebral atrophy and chronic white matter ischemic change.  Left achievable. COMPARISON: None. HISTORY: ORDERING SYSTEM PROVIDED HISTORY: fall sdh, sah TECHNOLOGIST PROVIDED HISTORY: fall sdh, sah FINDINGS: CTA NECK: AORTIC ARCH/ARCH VESSELS: No dissection or arterial injury. No significant stenosis of the brachiocephalic or subclavian arteries. CAROTID ARTERIES: No dissection, arterial injury, or hemodynamically significant stenosis by NASCET criteria. VERTEBRAL ARTERIES: No dissection, arterial injury, or significant stenosis. SOFT TISSUES: The lung apices are clear. No cervical or superior mediastinal lymphadenopathy. The larynx and pharynx are unremarkable. No acute abnormality of the salivary and thyroid glands. BONES: No acute osseous abnormality. CTA HEAD: ANTERIOR CIRCULATION: No significant stenosis of the intracranial internal carotid, anterior cerebral, or middle cerebral arteries. No aneurysm. The right A1 segment is diminutive. The left is prominent and there is a robust anterior communicating artery. POSTERIOR CIRCULATION: No significant stenosis of the vertebral, basilar, or posterior cerebral arteries. No aneurysm. Balanced posterior communicating artery and P1 segment are present on the left. No posterior communicating artery on the right. OTHER: No dural venous sinus thrombosis on this non-dedicated study. Left posterolateral scalp hematoma. BRAIN: Please refer to the report for the dedicated noncontrast head CT. Intracranial hemorrhage is present. Unremarkable CTA of the head and neck. Intracranial hemorrhage present. Please refer to the report for the dedicated noncontrast head CT. Left posterolateral scalp hematoma.      CT CHEST ABDOMEN PELVIS W CONTRAST Additional Contrast? None    Result Date: 11/29/2022  EXAMINATION: CT OF THE CHEST, ABDOMEN, AND PELVIS WITH CONTRAST; CT OF THE THORACIC SPINE WITHOUT CONTRAST; CT OF THE LUMBAR SPINE WITHOUT CONTRAST 11/29/2022 9:13 pm TECHNIQUE: CT of the chest, abdomen and pelvis was performed with the administration of intravenous contrast. Multiplanar reformatted images are provided for review. Automated exposure control, iterative reconstruction, and/or weight based adjustment of the mA/kV was utilized to reduce the radiation dose to as low as reasonably achievable.; CT of the thoracic spine was performed without the administration of intravenous contrast. Multiplanar reformatted images are provided for review. Automated exposure control, iterative reconstruction, and/or weight based adjustment of the mA/kV was utilized to reduce the radiation dose to as low as reasonably achievable.; CT of the lumbar spine was performed without the administration of intravenous contrast. Multiplanar reformatted images are provided for review. Adjustment of mA and/or kV according to patient size was utilized. Automated exposure control, iterative reconstruction, and/or weight based adjustment of the mA/kV was utilized to reduce the radiation dose to as low as reasonably achievable. COMPARISON: None HISTORY: ORDERING SYSTEM PROVIDED HISTORY: fall TECHNOLOGIST PROVIDED HISTORY: fall Reason for Exam: trauma fall Relevant Medical/Surgical History: exam repeated due to motion. pt is altered and won't hold still; ORDERING SYSTEM PROVIDED HISTORY: fall TECHNOLOGIST PROVIDED HISTORY: fall Reason for Exam: trauma fall FINDINGS: Examination is degraded by motion. Chest: Mediastinum: Heart is upper limits of normal in size without pericardial effusion. Thoracic aorta and main pulmonary artery are normal in caliber. No mediastinal hematoma. Lungs/pleura: There is no pleural effusion. There is no pneumothorax. There is subsegmental atelectasis in the lung bases. Soft Tissues/Bones: No displaced fracture within the limitations of motion. Abdomen/Pelvis: Organs: The liver is diffusely hypoattenuating. Prior cholecystectomy. No acute abnormality within the spleen, pancreas, or adrenal glands. No hydronephrosis.   Bilateral renal cysts measure up to 2.6 cm on the right. No further imaging follow-up is required. Subcentimeter hypoattenuating hepatic lesions are too small to accurately characterize, likely cysts or hemangiomas. GI/Bowel: There is a small hiatal hernia. The stomach is partially distended. The small bowel is nondilated. The colon is nondilated. There are scattered, noninflamed diverticula. Pelvis: Bladder is partially distended with layering contrast. Peritoneum/Retroperitoneum: No ascites or pneumoperitoneum. Abdominal aorta is normal in caliber with scattered atherosclerosis. Bones/Soft Tissues: No acute osseous abnormality. There is edema/contusion in the left upper thigh soft tissues. THORACIC/LUMBAR SPINES Mild dextrocurvature in the lower thoracic spine. No significant listhesis. Mild kyphosis. Vertebral body heights are maintained. No displaced fracture within the limitations of motion. There is levocurvature of the lumbar spine. There are 5 lumbar vertebral bodies. There is anterolisthesis at L3-4, L4-5, and L5-S1. Vertebral body heights are maintained. No displaced fracture. There are multilevel degenerative changes of the lumbar spine. 1. No acute intrathoracic abnormality. 2. No acute abdominal abnormality. 3. No acute abnormality in the thoracic or lumbar spines. 4. Edema/contusion in the left upper thigh soft tissues. 5. Hepatic steatosis. 6. Diverticulosis without diverticulitis. CT LUMBAR SPINE TRAUMA RECONSTRUCTION    Result Date: 11/29/2022  EXAMINATION: CT OF THE CHEST, ABDOMEN, AND PELVIS WITH CONTRAST; CT OF THE THORACIC SPINE WITHOUT CONTRAST; CT OF THE LUMBAR SPINE WITHOUT CONTRAST 11/29/2022 9:13 pm TECHNIQUE: CT of the chest, abdomen and pelvis was performed with the administration of intravenous contrast. Multiplanar reformatted images are provided for review.  Automated exposure control, iterative reconstruction, and/or weight based adjustment of the mA/kV was utilized to reduce the radiation dose to as low as reasonably achievable.; CT of the thoracic spine was performed without the administration of intravenous contrast. Multiplanar reformatted images are provided for review. Automated exposure control, iterative reconstruction, and/or weight based adjustment of the mA/kV was utilized to reduce the radiation dose to as low as reasonably achievable.; CT of the lumbar spine was performed without the administration of intravenous contrast. Multiplanar reformatted images are provided for review. Adjustment of mA and/or kV according to patient size was utilized. Automated exposure control, iterative reconstruction, and/or weight based adjustment of the mA/kV was utilized to reduce the radiation dose to as low as reasonably achievable. COMPARISON: None HISTORY: ORDERING SYSTEM PROVIDED HISTORY: fall TECHNOLOGIST PROVIDED HISTORY: fall Reason for Exam: trauma fall Relevant Medical/Surgical History: exam repeated due to motion. pt is altered and won't hold still; ORDERING SYSTEM PROVIDED HISTORY: fall TECHNOLOGIST PROVIDED HISTORY: fall Reason for Exam: trauma fall FINDINGS: Examination is degraded by motion. Chest: Mediastinum: Heart is upper limits of normal in size without pericardial effusion. Thoracic aorta and main pulmonary artery are normal in caliber. No mediastinal hematoma. Lungs/pleura: There is no pleural effusion. There is no pneumothorax. There is subsegmental atelectasis in the lung bases. Soft Tissues/Bones: No displaced fracture within the limitations of motion. Abdomen/Pelvis: Organs: The liver is diffusely hypoattenuating. Prior cholecystectomy. No acute abnormality within the spleen, pancreas, or adrenal glands. No hydronephrosis. Bilateral renal cysts measure up to 2.6 cm on the right. No further imaging follow-up is required. Subcentimeter hypoattenuating hepatic lesions are too small to accurately characterize, likely cysts or hemangiomas. GI/Bowel:  There is a small hiatal hernia. The stomach is partially distended. The small bowel is nondilated. The colon is nondilated. There are scattered, noninflamed diverticula. Pelvis: Bladder is partially distended with layering contrast. Peritoneum/Retroperitoneum: No ascites or pneumoperitoneum. Abdominal aorta is normal in caliber with scattered atherosclerosis. Bones/Soft Tissues: No acute osseous abnormality. There is edema/contusion in the left upper thigh soft tissues. THORACIC/LUMBAR SPINES Mild dextrocurvature in the lower thoracic spine. No significant listhesis. Mild kyphosis. Vertebral body heights are maintained. No displaced fracture within the limitations of motion. There is levocurvature of the lumbar spine. There are 5 lumbar vertebral bodies. There is anterolisthesis at L3-4, L4-5, and L5-S1. Vertebral body heights are maintained. No displaced fracture. There are multilevel degenerative changes of the lumbar spine. 1. No acute intrathoracic abnormality. 2. No acute abdominal abnormality. 3. No acute abnormality in the thoracic or lumbar spines. 4. Edema/contusion in the left upper thigh soft tissues. 5. Hepatic steatosis. 6. Diverticulosis without diverticulitis. CT THORACIC SPINE TRAUMA RECONSTRUCTION    Result Date: 11/29/2022  EXAMINATION: CT OF THE CHEST, ABDOMEN, AND PELVIS WITH CONTRAST; CT OF THE THORACIC SPINE WITHOUT CONTRAST; CT OF THE LUMBAR SPINE WITHOUT CONTRAST 11/29/2022 9:13 pm TECHNIQUE: CT of the chest, abdomen and pelvis was performed with the administration of intravenous contrast. Multiplanar reformatted images are provided for review. Automated exposure control, iterative reconstruction, and/or weight based adjustment of the mA/kV was utilized to reduce the radiation dose to as low as reasonably achievable.; CT of the thoracic spine was performed without the administration of intravenous contrast. Multiplanar reformatted images are provided for review.  Automated exposure control, iterative reconstruction, and/or weight based adjustment of the mA/kV was utilized to reduce the radiation dose to as low as reasonably achievable.; CT of the lumbar spine was performed without the administration of intravenous contrast. Multiplanar reformatted images are provided for review. Adjustment of mA and/or kV according to patient size was utilized. Automated exposure control, iterative reconstruction, and/or weight based adjustment of the mA/kV was utilized to reduce the radiation dose to as low as reasonably achievable. COMPARISON: None HISTORY: ORDERING SYSTEM PROVIDED HISTORY: fall TECHNOLOGIST PROVIDED HISTORY: fall Reason for Exam: trauma fall Relevant Medical/Surgical History: exam repeated due to motion. pt is altered and won't hold still; ORDERING SYSTEM PROVIDED HISTORY: fall TECHNOLOGIST PROVIDED HISTORY: fall Reason for Exam: trauma fall FINDINGS: Examination is degraded by motion. Chest: Mediastinum: Heart is upper limits of normal in size without pericardial effusion. Thoracic aorta and main pulmonary artery are normal in caliber. No mediastinal hematoma. Lungs/pleura: There is no pleural effusion. There is no pneumothorax. There is subsegmental atelectasis in the lung bases. Soft Tissues/Bones: No displaced fracture within the limitations of motion. Abdomen/Pelvis: Organs: The liver is diffusely hypoattenuating. Prior cholecystectomy. No acute abnormality within the spleen, pancreas, or adrenal glands. No hydronephrosis. Bilateral renal cysts measure up to 2.6 cm on the right. No further imaging follow-up is required. Subcentimeter hypoattenuating hepatic lesions are too small to accurately characterize, likely cysts or hemangiomas. GI/Bowel: There is a small hiatal hernia. The stomach is partially distended. The small bowel is nondilated. The colon is nondilated. There are scattered, noninflamed diverticula.  Pelvis: Bladder is partially distended with layering contrast. Peritoneum/Retroperitoneum: No ascites or pneumoperitoneum. Abdominal aorta is normal in caliber with scattered atherosclerosis. Bones/Soft Tissues: No acute osseous abnormality. There is edema/contusion in the left upper thigh soft tissues. THORACIC/LUMBAR SPINES Mild dextrocurvature in the lower thoracic spine. No significant listhesis. Mild kyphosis. Vertebral body heights are maintained. No displaced fracture within the limitations of motion. There is levocurvature of the lumbar spine. There are 5 lumbar vertebral bodies. There is anterolisthesis at L3-4, L4-5, and L5-S1. Vertebral body heights are maintained. No displaced fracture. There are multilevel degenerative changes of the lumbar spine. 1. No acute intrathoracic abnormality. 2. No acute abdominal abnormality. 3. No acute abnormality in the thoracic or lumbar spines. 4. Edema/contusion in the left upper thigh soft tissues. 5. Hepatic steatosis. 6. Diverticulosis without diverticulitis.              Time Spent for discharge: 30 minutes    Sylwia Peters DO  1/5/2023, 10:06 AM